# Patient Record
Sex: MALE | Race: BLACK OR AFRICAN AMERICAN | NOT HISPANIC OR LATINO | Employment: FULL TIME | URBAN - METROPOLITAN AREA
[De-identification: names, ages, dates, MRNs, and addresses within clinical notes are randomized per-mention and may not be internally consistent; named-entity substitution may affect disease eponyms.]

---

## 2017-06-07 ENCOUNTER — TELEPHONE (OUTPATIENT)
Dept: OPHTHALMOLOGY | Facility: CLINIC | Age: 47
End: 2017-06-07

## 2018-12-11 ENCOUNTER — OFFICE VISIT (OUTPATIENT)
Dept: UROLOGY | Facility: CLINIC | Age: 48
End: 2018-12-11
Payer: COMMERCIAL

## 2018-12-11 VITALS
WEIGHT: 232 LBS | HEIGHT: 67 IN | HEART RATE: 73 BPM | DIASTOLIC BLOOD PRESSURE: 103 MMHG | SYSTOLIC BLOOD PRESSURE: 176 MMHG | BODY MASS INDEX: 36.41 KG/M2

## 2018-12-11 DIAGNOSIS — N45.1 EPIDIDYMITIS: Primary | ICD-10-CM

## 2018-12-11 DIAGNOSIS — N13.8 BPH WITH OBSTRUCTION/LOWER URINARY TRACT SYMPTOMS: ICD-10-CM

## 2018-12-11 DIAGNOSIS — N40.1 BPH WITH OBSTRUCTION/LOWER URINARY TRACT SYMPTOMS: ICD-10-CM

## 2018-12-11 DIAGNOSIS — R35.1 NOCTURIA: ICD-10-CM

## 2018-12-11 LAB
BILIRUB SERPL-MCNC: ABNORMAL MG/DL
BLOOD URINE, POC: 50
COLOR, POC UA: YELLOW
GLUCOSE UR QL STRIP: ABNORMAL
KETONES UR QL STRIP: ABNORMAL
LEUKOCYTE ESTERASE URINE, POC: ABNORMAL
NITRITE, POC UA: ABNORMAL
PH, POC UA: 5
PROTEIN, POC: 30
SPECIFIC GRAVITY, POC UA: 1.02
UROBILINOGEN, POC UA: ABNORMAL

## 2018-12-11 PROCEDURE — 99204 OFFICE O/P NEW MOD 45 MIN: CPT | Mod: 25,S$GLB,, | Performed by: NURSE PRACTITIONER

## 2018-12-11 PROCEDURE — 87077 CULTURE AEROBIC IDENTIFY: CPT

## 2018-12-11 PROCEDURE — 81002 URINALYSIS NONAUTO W/O SCOPE: CPT | Mod: S$GLB,,, | Performed by: NURSE PRACTITIONER

## 2018-12-11 PROCEDURE — 87186 SC STD MICRODIL/AGAR DIL: CPT

## 2018-12-11 PROCEDURE — 87088 URINE BACTERIA CULTURE: CPT

## 2018-12-11 PROCEDURE — 3008F BODY MASS INDEX DOCD: CPT | Mod: CPTII,S$GLB,, | Performed by: NURSE PRACTITIONER

## 2018-12-11 PROCEDURE — 87086 URINE CULTURE/COLONY COUNT: CPT

## 2018-12-11 RX ORDER — TAMSULOSIN HYDROCHLORIDE 0.4 MG/1
0.4 CAPSULE ORAL DAILY
Qty: 30 CAPSULE | Refills: 11 | Status: SHIPPED | OUTPATIENT
Start: 2018-12-11 | End: 2019-07-26

## 2018-12-11 RX ORDER — CIPROFLOXACIN 500 MG/1
500 TABLET ORAL 2 TIMES DAILY
Qty: 20 TABLET | Refills: 0 | Status: SHIPPED | OUTPATIENT
Start: 2018-12-11 | End: 2018-12-21

## 2018-12-11 NOTE — PATIENT INSTRUCTIONS
Epididymitis  Inflammation of the epididymis can cause pain and swelling in your scrotum. The epididymis is a small tube next to the testicle that stores sperm. Epididymitis is usually caused by an infection. In sexually active men, it is often caused by a sexually transmitted disease (STD) such as chlamydia or gonorrhea. In boys and in men over 40, it can be from bacteria from other parts of the urinary tract (not an STD infection).  Symptoms may begin with pain in the lower belly (abdomen) or low back. The pain then spreads down into the scrotum. Usually only one side is affected. The testicle and scrotum swell and become very painful and red. You may have fever and a burning when passing urine. Sometimes you may have a discharge from the penis.  Treatment is with antibiotics, and anti-inflammatory and pain medicines. The condition should get better over the first few days of treatment. But it will take several weeks for all the swelling and discomfort to go away. If your healthcare provider suspects that an STD is the cause, your sexual partners may need to be treated.  Home care  The following will help you care for yourself at home:  · Support the scrotum. When lying down, place a rolled towel under the scrotum. When walking, use an athletic supporter or 2 pairs of jockey-style underwear.  · To relieve pain, put ice packs on the inflamed area. You can make your own ice pack by putting ice cubes in a sealed plastic bag wrapped in a thin towel.  · You may use over-the-counter medicines to control pain, unless another medicine was given. If you have chronic liver or kidney disease, talk with your healthcare provider before taking these medicines. Also talk with your provider if you've ever had a stomach ulcer or GI bleeding.  · Rest in bed for the first few days until the fever, pain, and swelling get better. It may take several weeks for all of the swelling to go away.  · Constipation can make you strain. This  makes the pain worse. Avoid constipation by eating natural laxatives such as prunes, fresh fruits, and whole-grain cereals. If necessary, use a mild over-the-counter laxative for constipation. Mineral oil can be used to keep the stools soft.  · Do not have sex until you have finished all treatment and all symptoms have cleared.  · Take all medicine as directed. Do not miss any doses and do not stop early even if you feel better.  Follow-up care  Follow up with your healthcare provider, or as advised, to be sure you are responding properly to treatment. If a culture was taken, you may call for the result as directed. A culture test can ensure that you are on the correct antibiotic.   When to seek medical advice  Call your healthcare provider right away if any of these occur:  · Fever of 100.4ºF (38ºC), or as directed by your healthcare provider  · Increasing pain or swelling of the testicle after starting treatment  · Pressure or pain in your bladder that gets worse  · Unable to pass urine for 8 hours  Date Last Reviewed: 9/1/2016  © 1622-8161 [employer]. [address] [place] [address], [place] [address]. All rights reserved. This information is not intended as a substitute for professional medical care. Always follow your healthcare professional's instructions.

## 2018-12-11 NOTE — PROGRESS NOTES
"Subjective:      Carmelo Castle Jr. is a 48 y.o. male who was self-referred for evaluation of his "scrotal swelling."      The patient presents today reporting left testicular pain/swelling that began over the last few days. He describes the pain as aching and moderate to severe in intensity. Aggravated by sitting and movement. Denies penile discharge. Not concerned about STD exposure.     Reports ongoing frequency, urgency and nocturia 5x/night. Drinks large amounts of caffeine and alcohol. Denies dysuria, fever/chills and hematuria. His AUASS today is 14/5.      The following portions of the patient's history were reviewed and updated as appropriate: allergies, current medications, past family history, past medical history, past social history, past surgical history and problem list.    Review of Systems  Constitutional: no fever or chills  ENT: no nasal congestion or sore throat  Respiratory: no cough or shortness of breath  Cardiovascular: no chest pain or palpitations  Gastrointestinal: no nausea or vomiting, tolerating diet  Genitourinary: as per HPI  Hematologic/Lymphatic: no easy bruising or lymphadenopathy  Musculoskeletal: no arthralgias or myalgias  Neurological: no seizures or tremors  Behavioral/Psych: no auditory or visual hallucinations     Objective:   Vitals:   Vitals:    12/11/18 1302   BP: (!) 176/103   Pulse: 73       Physical Exam   General: alert and oriented, no acute distress  Head: normocephalic, atraumatic  Neck: supple, no lymphadenopathy, normal ROM, no masses  Respiratory: Symmetric expansion, non-labored breathing  Cardiovascular: regular rate and rhythm, nomal pulses, no peripheral edema  Abdomen: soft, non tender, non distended, no palpable masses, no hernias, no hepatomegaly or splenomegaly  Genitourinary: deferred   Lymphatic: no inguinal nodes  Skin: normal coloration and turgor, no rashes, no suspicious skin lesions noted  Neuro: alert and oriented x3, no gross deficits  Psych: " normal judgment and insight, normal mood/affect and non-anxious  No CVA tenderness     Lab Review   Urinalysis demonstrates positive for leukocytes (trace), red blood cells (50 niall/mL), protein (+)  Lab Results   Component Value Date    WBC 7.90 06/30/2011    HGB 14.9 06/30/2011    HCT 43.8 06/30/2011    MCV 83.4 06/30/2011     (L) 06/30/2011     Lab Results   Component Value Date    CREATININE 0.9 06/30/2011    BUN 11 06/30/2011     No results found for: PSA  Imaging   None    Assessment:   Epididymitis  BPH with obstruction/lower urinary tract symptoms  Nocturia    Plan:   Carmelo was seen today for new patient.    Diagnoses and all orders for this visit:    Epididymitis  -     POCT URINE DIPSTICK WITHOUT MICROSCOPE  -     ciprofloxacin HCl (CIPRO) 500 MG tablet; Take 1 tablet (500 mg total) by mouth 2 (two) times daily. for 10 days  -     Urine culture    BPH with obstruction/lower urinary tract symptoms  -     tamsulosin (FLOMAX) 0.4 mg Cap; Take 1 capsule (0.4 mg total) by mouth once daily.    Nocturia    Plan:  --Urine culture  --Cipro BID x 10 days  --Discussed conservative measures for relieving testicular pain - scrotal support, ibuprofen, scrotal elevation, ice packs  --Trial of flomax  --Discussed common bladder irritants such as caffeine, alcohol, citrus, and acidic and spicy foods. Encouraged to minimize in diet to reduce symptoms.  --Follow up in 6-8 weeks with PVR

## 2018-12-14 LAB — BACTERIA UR CULT: NORMAL

## 2019-07-26 ENCOUNTER — HOSPITAL ENCOUNTER (EMERGENCY)
Facility: HOSPITAL | Age: 49
Discharge: HOME | End: 2019-07-26
Attending: EMERGENCY MEDICINE
Payer: COMMERCIAL

## 2019-07-26 VITALS
TEMPERATURE: 98 F | HEIGHT: 66 IN | BODY MASS INDEX: 35.68 KG/M2 | WEIGHT: 222 LBS | HEART RATE: 76 BPM | DIASTOLIC BLOOD PRESSURE: 105 MMHG | RESPIRATION RATE: 18 BRPM | SYSTOLIC BLOOD PRESSURE: 173 MMHG | OXYGEN SATURATION: 97 %

## 2019-07-26 DIAGNOSIS — M25.512 ACUTE PAIN OF LEFT SHOULDER: Primary | ICD-10-CM
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
## 2019-07-26 PROCEDURE — 99283 EMERGENCY DEPT VISIT LOW MDM: CPT

## 2019-07-26 PROCEDURE — 25000003 PHARM REV CODE 250: Performed by: PHYSICIAN ASSISTANT

## 2019-07-26 RX ORDER — IBUPROFEN 600 MG/1
600 TABLET ORAL
Status: COMPLETED | OUTPATIENT
Start: 2019-07-26 | End: 2019-07-26

## 2019-07-26 RX ORDER — METHOCARBAMOL 750 MG/1
1500 TABLET, FILM COATED ORAL 3 TIMES DAILY
Qty: 20 TABLET | Refills: 0 | Status: SHIPPED | OUTPATIENT
Start: 2019-07-26 | End: 2019-07-31

## 2019-07-26 RX ORDER — IBUPROFEN 600 MG/1
600 TABLET ORAL EVERY 8 HOURS PRN
Qty: 15 TABLET | Refills: 0 | Status: SHIPPED | OUTPATIENT
Start: 2019-07-26

## 2019-07-26 RX ORDER — ACETAMINOPHEN 500 MG
500 TABLET ORAL
Status: COMPLETED | OUTPATIENT
Start: 2019-07-26 | End: 2019-07-26

## 2019-07-26 RX ADMIN — IBUPROFEN 600 MG: 600 TABLET, FILM COATED ORAL at 05:07

## 2019-07-26 RX ADMIN — ACETAMINOPHEN 500 MG: 500 TABLET ORAL at 05:07
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
## 2020-05-30 ENCOUNTER — HOSPITAL ENCOUNTER (EMERGENCY)
Facility: HOSPITAL | Age: 50
Discharge: HOME | End: 2020-05-30
Attending: EMERGENCY MEDICINE
Payer: COMMERCIAL

## 2020-05-30 VITALS
BODY MASS INDEX: 35.68 KG/M2 | OXYGEN SATURATION: 100 % | RESPIRATION RATE: 18 BRPM | DIASTOLIC BLOOD PRESSURE: 88 MMHG | SYSTOLIC BLOOD PRESSURE: 139 MMHG | HEART RATE: 96 BPM | WEIGHT: 222 LBS | TEMPERATURE: 98 F | HEIGHT: 66 IN

## 2020-05-30 DIAGNOSIS — R51.9 NONINTRACTABLE HEADACHE, UNSPECIFIED CHRONICITY PATTERN, UNSPECIFIED HEADACHE TYPE: Primary | ICD-10-CM

## 2020-05-30 PROCEDURE — 63600175 PHARM REV CODE 636 W HCPCS: Performed by: PHYSICIAN ASSISTANT

## 2020-05-30 PROCEDURE — 99284 EMERGENCY DEPT VISIT MOD MDM: CPT | Mod: 25

## 2020-05-30 PROCEDURE — 96372 THER/PROPH/DIAG INJ SC/IM: CPT

## 2020-05-30 RX ORDER — KETOROLAC TROMETHAMINE 30 MG/ML
30 INJECTION, SOLUTION INTRAMUSCULAR; INTRAVENOUS
Status: COMPLETED | OUTPATIENT
Start: 2020-05-30 | End: 2020-05-30

## 2020-05-30 RX ORDER — KETOROLAC TROMETHAMINE 10 MG/1
10 TABLET, FILM COATED ORAL EVERY 6 HOURS
Qty: 12 TABLET | Refills: 0 | Status: SHIPPED | OUTPATIENT
Start: 2020-05-30 | End: 2020-06-02

## 2020-05-30 RX ORDER — METHOCARBAMOL 750 MG/1
1500 TABLET, FILM COATED ORAL 3 TIMES DAILY
Qty: 30 TABLET | Refills: 0 | Status: SHIPPED | OUTPATIENT
Start: 2020-05-30 | End: 2020-06-04

## 2020-05-30 RX ORDER — ORPHENADRINE CITRATE 30 MG/ML
30 INJECTION INTRAMUSCULAR; INTRAVENOUS
Status: COMPLETED | OUTPATIENT
Start: 2020-05-30 | End: 2020-05-30

## 2020-05-30 RX ADMIN — ORPHENADRINE CITRATE 30 MG: 30 INJECTION INTRAMUSCULAR; INTRAVENOUS at 01:05

## 2020-05-30 RX ADMIN — KETOROLAC TROMETHAMINE 30 MG: 30 INJECTION, SOLUTION INTRAMUSCULAR at 01:05

## 2020-05-30 NOTE — ED NOTES
Pt presents to the ED c/o+ HA x3 days with blurry vision x2 mos. Reports recently starting BP meds 2 days ago prescribed by PCP for HTN .  Pt had been previously diagnosed but was non complaint.

## 2020-05-30 NOTE — ED PROVIDER NOTES
Encounter Date: 5/30/2020       History     Chief Complaint   Patient presents with    Headache     Pt presents to ED today c/o neck pain and right side of head pain that radiates to front of head x 3 days. Also c/o blurry vision x 2 months. He reports he just started taking BP medication x 2 days ago, he was diagnoses with HTN years ago and was non compliant. He saw his PCP and was prescribed medication again.      Carmelo Castle Jr., a 50 y.o. male  has no past medical history on file.     He presents to the ED evaluation of headache x 3 days.  Patient states that he headache waxes and wanes.  Believe it is due to stress, as he recently experienced the death of his brother 2 days ago.  States that he was diagnosed with HTN in 2013 but has been non-complaint with medications until his brother's sudden death made him reconsider.  Started taking his medications 2 days ago.  Has suffered with blurry vision for 2 months, has prescription eye glasses, but feels that his vision gets worse every time he goes to the eye doctor and that the glasses may be contributing. Headache is throbbing and stabbing in nature, starts at the base of his neck and wraps around his head.  Treatments tried include use of excedrin migraine with some improvement.        The history is provided by the patient.     Review of patient's allergies indicates:   Allergen Reactions    Erythromycin Other (See Comments)     hallucinations     No past medical history on file.  No past surgical history on file.  Family History   Problem Relation Age of Onset    Heart disease Mother     Hypertension Mother      Social History     Tobacco Use    Smoking status: Current Some Day Smoker    Smokeless tobacco: Never Used   Substance Use Topics    Alcohol use: Yes    Drug use: No     Review of Systems   Constitutional: Negative for fever.   Eyes: Positive for visual disturbance (x 2 months). Negative for photophobia.   Gastrointestinal: Negative for  nausea and vomiting.   Musculoskeletal: Positive for neck pain and neck stiffness.   Neurological: Positive for headaches.       Physical Exam     Initial Vitals [05/30/20 1222]   BP Pulse Resp Temp SpO2   (!) 163/96 96 19 98 °F (36.7 °C) 96 %      MAP       --         Physical Exam    Nursing note and vitals reviewed.  Constitutional: He appears well-developed and well-nourished.   HENT:   Head: Normocephalic and atraumatic.   Right Ear: External ear normal.   Left Ear: External ear normal.   Nose: Nose normal.   Mouth/Throat: Oropharynx is clear and moist.   Eyes: EOM are normal.   Neck: Normal range of motion and full passive range of motion without pain. Neck supple. No spinous process tenderness and no muscular tenderness present. Normal range of motion present. No neck rigidity.   Cardiovascular: Normal rate and regular rhythm.   Pulmonary/Chest: Breath sounds normal. No respiratory distress. He has no wheezes. He has no rales.   Abdominal: Soft. Bowel sounds are normal. He exhibits no distension.   Musculoskeletal: Normal range of motion. He exhibits no edema or tenderness.   Lymphadenopathy:     He has no cervical adenopathy.   Neurological: He is alert and oriented to person, place, and time. No cranial nerve deficit or sensory deficit. GCS score is 15. GCS eye subscore is 4. GCS verbal subscore is 5. GCS motor subscore is 6.   Skin: Skin is warm and dry. Capillary refill takes less than 2 seconds. No rash and no abscess noted. No erythema.   Psychiatric: He has a normal mood and affect. Thought content normal.         ED Course   Procedures  Labs Reviewed - No data to display       Imaging Results    None          Medical Decision Making:   Initial Assessment:   Headache  Differential Diagnosis:   Tension headache, migraine,  cluster headache, sinus headache    ED Management:  Feel the patient's headache is benign.  The headache improved with toradol and norflex.  No neck stiffness, vision changes, fever,  "rash, meningismus/neck stiffness to suggest pseudotumor cerebri or meningitis.  No pain over temporal arteries or vision changes/loss to suggest temporal arteritis.  No "thunderclap onset" or neck stiffness to suggest spontaneous SAH/ICH.  No lancinated pain to eyes with tearing to suggest cluster headache.  No sinus pressure or nasal congestion to suggest sinus headache.  Patient's headache is not in a "band like" distrubution to suggest tension headache.                                  Clinical Impression:       ICD-10-CM ICD-9-CM   1. Nonintractable headache, unspecified chronicity pattern, unspecified headache type R51 784.0             ED Disposition Condition    Discharge Stable        ED Prescriptions     Medication Sig Dispense Start Date End Date Auth. Provider    ketorolac (TORADOL) 10 mg tablet Take 1 tablet (10 mg total) by mouth every 6 (six) hours. for 3 days 12 tablet 5/30/2020 6/2/2020 Patti Pierce PA-C    methocarbamoL (ROBAXIN) 750 MG Tab Take 2 tablets (1,500 mg total) by mouth 3 (three) times daily. for 5 days 30 tablet 5/30/2020 6/4/2020 Patti Pierce PA-C        Follow-up Information     Follow up With Specialties Details Why Contact Info    Jim Silva MD Family Medicine In 3 days  [address] [facility] [address]  [phone]                                       Patti Pierce PA-C  05/30/20 1503    "

## 2020-05-30 NOTE — ED NOTES
APPEARANCE: Alert, oriented in NAD   CARDIAC: Normal rate and rhythm   PERIPHERAL VASCULAR: peripheral pulses present. Normal cap refill. No edema. Warm to touch.    RESPIRATORY: Respirations are equal and unlabored no obvious signs of distress.  GASTRO: soft, bowel sounds normal, no tenderness, no abdominal distention.  MUSC: Full ROM. No bony tenderness or soft tissue tenderness. No obvious deformity.  SKIN: Skin is warm and dry, normal skin turgor, mucous membranes moist.  NEURO: Reports + frontal HA x3 days   MENTAL STATUS: awake, alert oriented   EYE: PERRL reports blurry vision x2 months

## 2022-06-06 ENCOUNTER — HOSPITAL ENCOUNTER (OUTPATIENT)
Facility: HOSPITAL | Age: 52
Discharge: HOME | End: 2022-06-07
Attending: EMERGENCY MEDICINE | Admitting: HOSPITALIST
Payer: COMMERCIAL

## 2022-06-06 DIAGNOSIS — R07.9 CHEST PAIN: ICD-10-CM

## 2022-06-06 DIAGNOSIS — I16.1 HYPERTENSIVE EMERGENCY WITHOUT CONGESTIVE HEART FAILURE: ICD-10-CM

## 2022-06-06 DIAGNOSIS — R07.89 OTHER CHEST PAIN: ICD-10-CM

## 2022-06-06 DIAGNOSIS — I16.0 HYPERTENSIVE URGENCY: Primary | ICD-10-CM

## 2022-06-06 PROBLEM — I21.4 NSTEMI (NON-ST ELEVATED MYOCARDIAL INFARCTION): Status: ACTIVE | Noted: 2022-06-06

## 2022-06-06 LAB
ALBUMIN SERPL BCP-MCNC: 4 G/DL (ref 3.5–5.2)
ALP SERPL-CCNC: 98 U/L (ref 55–135)
ALT SERPL W/O P-5'-P-CCNC: 35 U/L (ref 10–44)
ANION GAP SERPL CALC-SCNC: 12 MMOL/L (ref 8–16)
AST SERPL-CCNC: 24 U/L (ref 10–40)
BASOPHILS # BLD AUTO: 0.04 K/UL (ref 0–0.2)
BASOPHILS NFR BLD: 0.4 % (ref 0–1.9)
BILIRUB SERPL-MCNC: 0.6 MG/DL (ref 0.1–1)
BILIRUB UR QL STRIP: NEGATIVE
BNP SERPL-MCNC: <10 PG/ML (ref 0–99)
BUN SERPL-MCNC: 14 MG/DL (ref 6–20)
CALCIUM SERPL-MCNC: 9.7 MG/DL (ref 8.7–10.5)
CHLORIDE SERPL-SCNC: 106 MMOL/L (ref 95–110)
CLARITY UR: CLEAR
CO2 SERPL-SCNC: 23 MMOL/L (ref 23–29)
COLOR UR: YELLOW
CREAT SERPL-MCNC: 1 MG/DL (ref 0.5–1.4)
DIFFERENTIAL METHOD: ABNORMAL
EOSINOPHIL # BLD AUTO: 0.3 K/UL (ref 0–0.5)
EOSINOPHIL NFR BLD: 3.1 % (ref 0–8)
ERYTHROCYTE [DISTWIDTH] IN BLOOD BY AUTOMATED COUNT: 13.9 % (ref 11.5–14.5)
EST. GFR  (AFRICAN AMERICAN): >60 ML/MIN/1.73 M^2
EST. GFR  (NON AFRICAN AMERICAN): >60 ML/MIN/1.73 M^2
GLUCOSE SERPL-MCNC: 99 MG/DL (ref 70–110)
GLUCOSE UR QL STRIP: NEGATIVE
HCT VFR BLD AUTO: 50.2 % (ref 40–54)
HGB BLD-MCNC: 16.3 G/DL (ref 14–18)
HGB UR QL STRIP: NEGATIVE
IMM GRANULOCYTES # BLD AUTO: 0.11 K/UL (ref 0–0.04)
IMM GRANULOCYTES NFR BLD AUTO: 1.1 % (ref 0–0.5)
KETONES UR QL STRIP: NEGATIVE
LEUKOCYTE ESTERASE UR QL STRIP: NEGATIVE
LYMPHOCYTES # BLD AUTO: 3 K/UL (ref 1–4.8)
LYMPHOCYTES NFR BLD: 30.9 % (ref 18–48)
MCH RBC QN AUTO: 27.7 PG (ref 27–31)
MCHC RBC AUTO-ENTMCNC: 32.5 G/DL (ref 32–36)
MCV RBC AUTO: 85 FL (ref 82–98)
MONOCYTES # BLD AUTO: 0.8 K/UL (ref 0.3–1)
MONOCYTES NFR BLD: 7.9 % (ref 4–15)
NEUTROPHILS # BLD AUTO: 5.5 K/UL (ref 1.8–7.7)
NEUTROPHILS NFR BLD: 56.6 % (ref 38–73)
NITRITE UR QL STRIP: NEGATIVE
NRBC BLD-RTO: 0 /100 WBC
PH UR STRIP: 6 [PH] (ref 5–8)
PLATELET # BLD AUTO: 144 K/UL (ref 150–450)
PMV BLD AUTO: 13.8 FL (ref 9.2–12.9)
POTASSIUM SERPL-SCNC: 4.5 MMOL/L (ref 3.5–5.1)
PROT SERPL-MCNC: 8.1 G/DL (ref 6–8.4)
PROT UR QL STRIP: NEGATIVE
RBC # BLD AUTO: 5.89 M/UL (ref 4.6–6.2)
SODIUM SERPL-SCNC: 141 MMOL/L (ref 136–145)
SP GR UR STRIP: 1.02 (ref 1–1.03)
TROPONIN I SERPL DL<=0.01 NG/ML-MCNC: 0.03 NG/ML (ref 0–0.03)
URN SPEC COLLECT METH UR: NORMAL
UROBILINOGEN UR STRIP-ACNC: NEGATIVE EU/DL
WBC # BLD AUTO: 9.69 K/UL (ref 3.9–12.7)

## 2022-06-06 PROCEDURE — A4216 STERILE WATER/SALINE, 10 ML: HCPCS | Performed by: NURSE PRACTITIONER

## 2022-06-06 PROCEDURE — 81003 URINALYSIS AUTO W/O SCOPE: CPT | Performed by: NURSE PRACTITIONER

## 2022-06-06 PROCEDURE — 96372 THER/PROPH/DIAG INJ SC/IM: CPT | Mod: 59 | Performed by: NURSE PRACTITIONER

## 2022-06-06 PROCEDURE — 36415 COLL VENOUS BLD VENIPUNCTURE: CPT | Performed by: NURSE PRACTITIONER

## 2022-06-06 PROCEDURE — G0378 HOSPITAL OBSERVATION PER HR: HCPCS

## 2022-06-06 PROCEDURE — 84484 ASSAY OF TROPONIN QUANT: CPT | Performed by: EMERGENCY MEDICINE

## 2022-06-06 PROCEDURE — 25000003 PHARM REV CODE 250: Performed by: EMERGENCY MEDICINE

## 2022-06-06 PROCEDURE — 93010 EKG 12-LEAD: ICD-10-PCS | Mod: ,,, | Performed by: INTERNAL MEDICINE

## 2022-06-06 PROCEDURE — 99285 EMERGENCY DEPT VISIT HI MDM: CPT | Mod: 25

## 2022-06-06 PROCEDURE — 63600175 PHARM REV CODE 636 W HCPCS: Performed by: NURSE PRACTITIONER

## 2022-06-06 PROCEDURE — 93010 ELECTROCARDIOGRAM REPORT: CPT | Mod: ,,, | Performed by: INTERNAL MEDICINE

## 2022-06-06 PROCEDURE — 80053 COMPREHEN METABOLIC PANEL: CPT | Performed by: EMERGENCY MEDICINE

## 2022-06-06 PROCEDURE — 83880 ASSAY OF NATRIURETIC PEPTIDE: CPT | Performed by: EMERGENCY MEDICINE

## 2022-06-06 PROCEDURE — 25000003 PHARM REV CODE 250: Performed by: NURSE PRACTITIONER

## 2022-06-06 PROCEDURE — 85025 COMPLETE CBC W/AUTO DIFF WBC: CPT | Performed by: EMERGENCY MEDICINE

## 2022-06-06 PROCEDURE — 93005 ELECTROCARDIOGRAM TRACING: CPT

## 2022-06-06 PROCEDURE — 84484 ASSAY OF TROPONIN QUANT: CPT | Mod: 91 | Performed by: NURSE PRACTITIONER

## 2022-06-06 PROCEDURE — 96374 THER/PROPH/DIAG INJ IV PUSH: CPT

## 2022-06-06 PROCEDURE — 96376 TX/PRO/DX INJ SAME DRUG ADON: CPT

## 2022-06-06 PROCEDURE — 63600175 PHARM REV CODE 636 W HCPCS: Performed by: EMERGENCY MEDICINE

## 2022-06-06 RX ORDER — ACETAMINOPHEN 325 MG/1
650 TABLET ORAL EVERY 8 HOURS PRN
Status: DISCONTINUED | OUTPATIENT
Start: 2022-06-06 | End: 2022-06-07 | Stop reason: HOSPADM

## 2022-06-06 RX ORDER — HYDRALAZINE HYDROCHLORIDE 20 MG/ML
10 INJECTION INTRAMUSCULAR; INTRAVENOUS EVERY 6 HOURS PRN
Status: DISCONTINUED | OUTPATIENT
Start: 2022-06-06 | End: 2022-06-07 | Stop reason: HOSPADM

## 2022-06-06 RX ORDER — ENOXAPARIN SODIUM 100 MG/ML
40 INJECTION SUBCUTANEOUS EVERY 24 HOURS
Status: DISCONTINUED | OUTPATIENT
Start: 2022-06-06 | End: 2022-06-07 | Stop reason: HOSPADM

## 2022-06-06 RX ORDER — HYDROCHLOROTHIAZIDE 25 MG/1
25 TABLET ORAL DAILY
Status: ON HOLD | COMMUNITY
End: 2022-06-07 | Stop reason: SDUPTHER

## 2022-06-06 RX ORDER — NALOXONE HCL 0.4 MG/ML
0.02 VIAL (ML) INJECTION
Status: DISCONTINUED | OUTPATIENT
Start: 2022-06-06 | End: 2022-06-07 | Stop reason: HOSPADM

## 2022-06-06 RX ORDER — IBUPROFEN 200 MG
16 TABLET ORAL
Status: DISCONTINUED | OUTPATIENT
Start: 2022-06-06 | End: 2022-06-07 | Stop reason: HOSPADM

## 2022-06-06 RX ORDER — TALC
6 POWDER (GRAM) TOPICAL NIGHTLY PRN
Status: DISCONTINUED | OUTPATIENT
Start: 2022-06-06 | End: 2022-06-07 | Stop reason: HOSPADM

## 2022-06-06 RX ORDER — HYDRALAZINE HYDROCHLORIDE 20 MG/ML
5 INJECTION INTRAMUSCULAR; INTRAVENOUS
Status: COMPLETED | OUTPATIENT
Start: 2022-06-06 | End: 2022-06-06

## 2022-06-06 RX ORDER — ONDANSETRON 2 MG/ML
4 INJECTION INTRAMUSCULAR; INTRAVENOUS EVERY 8 HOURS PRN
Status: DISCONTINUED | OUTPATIENT
Start: 2022-06-06 | End: 2022-06-07 | Stop reason: HOSPADM

## 2022-06-06 RX ORDER — CLONIDINE HYDROCHLORIDE 0.1 MG/1
0.1 TABLET ORAL
Status: COMPLETED | OUTPATIENT
Start: 2022-06-06 | End: 2022-06-06

## 2022-06-06 RX ORDER — AMLODIPINE BESYLATE 10 MG/1
10 TABLET ORAL DAILY
Status: ON HOLD | COMMUNITY
End: 2022-06-07 | Stop reason: SDUPTHER

## 2022-06-06 RX ORDER — GLUCAGON 1 MG
1 KIT INJECTION
Status: DISCONTINUED | OUTPATIENT
Start: 2022-06-06 | End: 2022-06-07 | Stop reason: HOSPADM

## 2022-06-06 RX ORDER — SODIUM CHLORIDE 0.9 % (FLUSH) 0.9 %
10 SYRINGE (ML) INJECTION EVERY 8 HOURS
Status: DISCONTINUED | OUTPATIENT
Start: 2022-06-06 | End: 2022-06-07 | Stop reason: HOSPADM

## 2022-06-06 RX ORDER — ASPIRIN 81 MG/1
81 TABLET ORAL DAILY
Status: DISCONTINUED | OUTPATIENT
Start: 2022-06-07 | End: 2022-06-07 | Stop reason: HOSPADM

## 2022-06-06 RX ORDER — NAPROXEN SODIUM 220 MG/1
324 TABLET, FILM COATED ORAL
Status: COMPLETED | OUTPATIENT
Start: 2022-06-06 | End: 2022-06-06

## 2022-06-06 RX ORDER — IPRATROPIUM BROMIDE AND ALBUTEROL SULFATE 2.5; .5 MG/3ML; MG/3ML
3 SOLUTION RESPIRATORY (INHALATION) EVERY 4 HOURS PRN
Status: DISCONTINUED | OUTPATIENT
Start: 2022-06-06 | End: 2022-06-07 | Stop reason: HOSPADM

## 2022-06-06 RX ORDER — IBUPROFEN 200 MG
24 TABLET ORAL
Status: DISCONTINUED | OUTPATIENT
Start: 2022-06-06 | End: 2022-06-07 | Stop reason: HOSPADM

## 2022-06-06 RX ADMIN — HYDRALAZINE HYDROCHLORIDE 5 MG: 20 INJECTION, SOLUTION INTRAMUSCULAR; INTRAVENOUS at 09:06

## 2022-06-06 RX ADMIN — CLONIDINE HYDROCHLORIDE 0.1 MG: 0.1 TABLET ORAL at 06:06

## 2022-06-06 RX ADMIN — ENOXAPARIN SODIUM 40 MG: 100 INJECTION SUBCUTANEOUS at 11:06

## 2022-06-06 RX ADMIN — Medication 10 ML: at 11:06

## 2022-06-06 RX ADMIN — ASPIRIN 324 MG: 81 TABLET, CHEWABLE ORAL at 09:06

## 2022-06-06 NOTE — FIRST PROVIDER EVALUATION
" Emergency Department TeleTriage Encounter Note      CHIEF COMPLAINT    Chief Complaint   Patient presents with    Hypertension     Pt has hx of HTN, non compliment with b/p meds, pt states since last wed, left hand " feels like increased pressure in hand"  and on " wed or thurs saw black dots after getting up, which lasted from a few min, then resolved"        VITAL SIGNS   Initial Vitals [06/06/22 1611]   BP Pulse Resp Temp SpO2   (!) 179/92 87 18 98.3 °F (36.8 °C) 99 %      MAP       --            ALLERGIES    Review of patient's allergies indicates:   Allergen Reactions    Erythromycin Other (See Comments)     hallucinations       PROVIDER TRIAGE NOTE  Pt c hx of HTN non-compliant c meds.  Went to  last week and noted SBP@230.  Pt states he was told to come to ED but started old amilodipine tab instead.  Came to ED for eval today because intermittent CP and L arm soreness (He was concerned @ "stroke).  Labs/clonidine ordered.  Pt will be seen ASAP by the on site clinician for further evaluation.      ORDERS  Labs Reviewed - No data to display    ED Orders (720h ago, onward)    None            Virtual Visit Note: The provider triage portion of this emergency department evaluation and documentation was performed via [employer], a HIPAA-compliant telemedicine application, in concert with a tele-presenter in the room. A face to face patient evaluation with one of my colleagues will occur once the patient is placed in an emergency department room.      DISCLAIMER: This note was prepared with [employer] voice recognition transcription software. Garbled syntax, mangled pronouns, and other bizarre constructions may be attributed to that software system.  "

## 2022-06-06 NOTE — Clinical Note
Diagnosis: Chest pain [303286]   Future Attending Provider: FAM MEEHAN [[id number]]   Admitting Provider:: FAM MEEHAN [[id number]]   Special Needs:: No Special Needs [1]

## 2022-06-07 ENCOUNTER — CLINICAL SUPPORT (OUTPATIENT)
Dept: SMOKING CESSATION | Facility: CLINIC | Age: 52
End: 2022-06-07
Payer: COMMERCIAL

## 2022-06-07 VITALS
SYSTOLIC BLOOD PRESSURE: 145 MMHG | OXYGEN SATURATION: 97 % | HEIGHT: 66 IN | BODY MASS INDEX: 40.66 KG/M2 | DIASTOLIC BLOOD PRESSURE: 85 MMHG | HEART RATE: 84 BPM | WEIGHT: 253 LBS | TEMPERATURE: 97 F | RESPIRATION RATE: 18 BRPM

## 2022-06-07 DIAGNOSIS — F17.210 CIGARETTE SMOKER: Primary | ICD-10-CM

## 2022-06-07 PROBLEM — E66.01 MORBID OBESITY: Status: ACTIVE | Noted: 2022-06-07

## 2022-06-07 PROBLEM — F17.200 TOBACCO DEPENDENCE: Status: ACTIVE | Noted: 2022-06-07

## 2022-06-07 PROBLEM — R07.9 CHEST PAIN: Status: ACTIVE | Noted: 2022-06-06

## 2022-06-07 PROBLEM — I20.9 ACUTE ANGINA: Status: ACTIVE | Noted: 2022-06-06

## 2022-06-07 LAB
AORTIC ROOT ANNULUS: 3.22 CM
AORTIC VALVE CUSP SEPERATION: 1.9 CM
AV INDEX (PROSTH): 0.86
AV MEAN GRADIENT: 3 MMHG
AV PEAK GRADIENT: 5 MMHG
AV VALVE AREA: 2.8 CM2
AV VELOCITY RATIO: 0.85
BSA FOR ECHO PROCEDURE: 2.31 M2
CHOLEST SERPL-MCNC: 176 MG/DL (ref 120–199)
CHOLEST/HDLC SERPL: 2.3 {RATIO} (ref 2–5)
CV ECHO LV RWT: 0.47 CM
DOP CALC AO PEAK VEL: 1.17 M/S
DOP CALC AO VTI: 21.17 CM
DOP CALC LVOT AREA: 3.3 CM2
DOP CALC LVOT DIAMETER: 2.04 CM
DOP CALC LVOT PEAK VEL: 0.99 M/S
DOP CALC LVOT STROKE VOLUME: 59.33 CM3
DOP CALC MV VTI: 26.47 CM
DOP CALCLVOT PEAK VEL VTI: 18.16 CM
E WAVE DECELERATION TIME: 237.45 MSEC
E/A RATIO: 0.74
E/E' RATIO: 9.85 M/S
ECHO LV POSTERIOR WALL: 1.16 CM (ref 0.6–1.1)
EJECTION FRACTION: 55 %
FRACTIONAL SHORTENING: 27 % (ref 28–44)
HDLC SERPL-MCNC: 76 MG/DL (ref 40–75)
HDLC SERPL: 43.2 % (ref 20–50)
INTERVENTRICULAR SEPTUM: 1.22 CM (ref 0.6–1.1)
IVRT: 74.22 MSEC
LA MAJOR: 5.12 CM
LA MINOR: 4.94 CM
LA WIDTH: 3.42 CM
LDLC SERPL CALC-MCNC: 72 MG/DL (ref 63–159)
LEFT ATRIUM SIZE: 3.31 CM
LEFT ATRIUM VOLUME INDEX MOD: 14.7 ML/M2
LEFT ATRIUM VOLUME INDEX: 21.9 ML/M2
LEFT ATRIUM VOLUME MOD: 32.5 CM3
LEFT ATRIUM VOLUME: 48.38 CM3
LEFT INTERNAL DIMENSION IN SYSTOLE: 3.63 CM (ref 2.1–4)
LEFT VENTRICLE DIASTOLIC VOLUME INDEX: 52.29 ML/M2
LEFT VENTRICLE DIASTOLIC VOLUME: 115.55 ML
LEFT VENTRICLE MASS INDEX: 103 G/M2
LEFT VENTRICLE SYSTOLIC VOLUME INDEX: 25.2 ML/M2
LEFT VENTRICLE SYSTOLIC VOLUME: 55.59 ML
LEFT VENTRICULAR INTERNAL DIMENSION IN DIASTOLE: 4.95 CM (ref 3.5–6)
LEFT VENTRICULAR MASS: 227.36 G
LV LATERAL E/E' RATIO: 8 M/S
LV SEPTAL E/E' RATIO: 12.8 M/S
MV A" WAVE DURATION": 9.71 MSEC
MV MEAN GRADIENT: 0 MMHG
MV PEAK A VEL: 0.87 M/S
MV PEAK E VEL: 0.64 M/S
MV PEAK GRADIENT: 3 MMHG
MV STENOSIS PRESSURE HALF TIME: 68.86 MS
MV VALVE AREA BY CONTINUITY EQUATION: 2.24 CM2
MV VALVE AREA P 1/2 METHOD: 3.19 CM2
NONHDLC SERPL-MCNC: 100 MG/DL
PISA TR MAX VEL: 1.16 M/S
PULM VEIN S/D RATIO: 1.29
PV PEAK D VEL: 0.35 M/S
PV PEAK S VEL: 0.45 M/S
PV PEAK VELOCITY: 1.06 CM/S
RA MAJOR: 4.51 CM
RA PRESSURE: 3 MMHG
RA WIDTH: 3.16 CM
RIGHT VENTRICULAR END-DIASTOLIC DIMENSION: 2.61 CM
TDI LATERAL: 0.08 M/S
TDI SEPTAL: 0.05 M/S
TDI: 0.07 M/S
TR MAX PG: 5 MMHG
TRICUSPID ANNULAR PLANE SYSTOLIC EXCURSION: 2.17 CM
TRIGL SERPL-MCNC: 140 MG/DL (ref 30–150)
TROPONIN I SERPL DL<=0.01 NG/ML-MCNC: 0.03 NG/ML (ref 0–0.03)
TROPONIN I SERPL DL<=0.01 NG/ML-MCNC: 0.04 NG/ML (ref 0–0.03)
TV REST PULMONARY ARTERY PRESSURE: 8 MMHG

## 2022-06-07 PROCEDURE — 99900035 HC TECH TIME PER 15 MIN (STAT)

## 2022-06-07 PROCEDURE — 36415 COLL VENOUS BLD VENIPUNCTURE: CPT | Performed by: NURSE PRACTITIONER

## 2022-06-07 PROCEDURE — 99407 PR TOBACCO USE CESSATION INTENSIVE >10 MINUTES: ICD-10-PCS | Mod: S$GLB,,,

## 2022-06-07 PROCEDURE — G0378 HOSPITAL OBSERVATION PER HR: HCPCS

## 2022-06-07 PROCEDURE — 84484 ASSAY OF TROPONIN QUANT: CPT | Performed by: NURSE PRACTITIONER

## 2022-06-07 PROCEDURE — 80061 LIPID PANEL: CPT | Performed by: NURSE PRACTITIONER

## 2022-06-07 PROCEDURE — 94760 N-INVAS EAR/PLS OXIMETRY 1: CPT

## 2022-06-07 PROCEDURE — A4216 STERILE WATER/SALINE, 10 ML: HCPCS | Performed by: NURSE PRACTITIONER

## 2022-06-07 PROCEDURE — 25000003 PHARM REV CODE 250: Performed by: NURSE PRACTITIONER

## 2022-06-07 PROCEDURE — 99219 PR INITIAL OBSERVATION CARE,LEVL II: CPT | Mod: 25,,, | Performed by: INTERNAL MEDICINE

## 2022-06-07 PROCEDURE — 99219 PR INITIAL OBSERVATION CARE,LEVL II: ICD-10-PCS | Mod: 25,,, | Performed by: INTERNAL MEDICINE

## 2022-06-07 PROCEDURE — 99999 PR PBB SHADOW E&M-EST. PATIENT-LVL I: CPT | Mod: PBBFAC,,,

## 2022-06-07 PROCEDURE — 99999 PR PBB SHADOW E&M-EST. PATIENT-LVL I: ICD-10-PCS | Mod: PBBFAC,,,

## 2022-06-07 PROCEDURE — 99407 BEHAV CHNG SMOKING > 10 MIN: CPT | Mod: S$GLB,,,

## 2022-06-07 RX ORDER — HYDROCHLOROTHIAZIDE 25 MG/1
25 TABLET ORAL DAILY
Status: DISCONTINUED | OUTPATIENT
Start: 2022-06-07 | End: 2022-06-07 | Stop reason: HOSPADM

## 2022-06-07 RX ORDER — HYDROCHLOROTHIAZIDE 25 MG/1
25 TABLET ORAL DAILY
Qty: 30 TABLET | Refills: 0 | Status: SHIPPED | OUTPATIENT
Start: 2022-06-07 | End: 2022-06-07 | Stop reason: SDUPTHER

## 2022-06-07 RX ORDER — AMLODIPINE BESYLATE 10 MG/1
10 TABLET ORAL DAILY
Qty: 30 TABLET | Refills: 0 | Status: SHIPPED | OUTPATIENT
Start: 2022-06-07

## 2022-06-07 RX ORDER — ASPIRIN 81 MG/1
81 TABLET ORAL DAILY
Qty: 30 TABLET | Refills: 0 | Status: SHIPPED | OUTPATIENT
Start: 2022-06-08 | End: 2023-06-08

## 2022-06-07 RX ORDER — HYDROCHLOROTHIAZIDE 50 MG/1
50 TABLET ORAL DAILY
Qty: 30 TABLET | Refills: 0 | Status: SHIPPED | OUTPATIENT
Start: 2022-06-07

## 2022-06-07 RX ORDER — AMLODIPINE BESYLATE 5 MG/1
10 TABLET ORAL DAILY
Status: DISCONTINUED | OUTPATIENT
Start: 2022-06-07 | End: 2022-06-07 | Stop reason: HOSPADM

## 2022-06-07 RX ADMIN — AMLODIPINE BESYLATE 10 MG: 5 TABLET ORAL at 08:06

## 2022-06-07 RX ADMIN — HYDROCHLOROTHIAZIDE 25 MG: 25 TABLET ORAL at 08:06

## 2022-06-07 RX ADMIN — Medication 10 ML: at 05:06

## 2022-06-07 RX ADMIN — Medication 6 MG: at 01:06

## 2022-06-07 RX ADMIN — ASPIRIN 81 MG: 81 TABLET, COATED ORAL at 08:06

## 2022-06-07 NOTE — ASSESSMENT & PLAN NOTE
"-Hypertensive on admission with elevation in troponin  -Reports not taking amlodipine and HCTZ prescribed in 2018 in years "I was gone beat this without it"  -Reports he was changing his diet to resolve hypertension  -For past two weeks been experiencing SOB and presented to ED for CP, SOB, and dizziness  -Given clonidine and hydralazine in the ED with noted improvement  -PRN hydralazine for SBP > 170 DBP > 110  -Restart home meds  -BP improved after resuming home HTN meds. Patient advised to continue home meds and educated regarding risks associated with non-compliance.  -Will give rx for HTN meds. Follow up with PCP outpatient.    "

## 2022-06-07 NOTE — SUBJECTIVE & OBJECTIVE
Past Medical History:   Diagnosis Date    Hypertension        History reviewed. No pertinent surgical history.    Review of patient's allergies indicates:   Allergen Reactions    Erythromycin Other (See Comments)     hallucinations       No current facility-administered medications on file prior to encounter.     Current Outpatient Medications on File Prior to Encounter   Medication Sig    amLODIPine (NORVASC) 10 MG tablet Take 10 mg by mouth once daily.    hydroCHLOROthiazide (HYDRODIURIL) 25 MG tablet Take 25 mg by mouth once daily.    ibuprofen (ADVIL,MOTRIN) 600 MG tablet Take 1 tablet (600 mg total) by mouth every 8 (eight) hours as needed for Pain.     Family History       Problem Relation (Age of Onset)    Heart disease Mother    Hypertension Mother          Tobacco Use    Smoking status: Current Every Day Smoker     Years: 38.00     Start date: 1984    Smokeless tobacco: Never Used    Tobacco comment: Enrolled in the [employer] on 6/7/22.  Ambulatory referral to Smoking Cessation jacqueline.   Substance and Sexual Activity    Alcohol use: Yes    Drug use: No    Sexual activity: Yes     Review of Systems   Constitutional: Negative. Negative for diaphoresis.   HENT: Negative.     Eyes:  Positive for visual disturbance.   Cardiovascular:  Positive for chest pain. Negative for irregular heartbeat, leg swelling, near-syncope, orthopnea, palpitations, paroxysmal nocturnal dyspnea and syncope.   Respiratory:  Negative for cough and shortness of breath.    Endocrine: Negative.    Hematologic/Lymphatic: Negative.    Skin: Negative.    Musculoskeletal: Negative.    Gastrointestinal: Negative.  Negative for nausea and vomiting.   Genitourinary: Negative.    Neurological:  Positive for light-headedness and paresthesias.   Psychiatric/Behavioral: Negative.     Allergic/Immunologic: Negative.    Objective:     Vital Signs (Most Recent):  Temp: 96.4 °F (35.8 °C) (06/07/22 1053)  Pulse: 71 (06/07/22 1053)  Resp: 18  (06/07/22 1053)  BP: (!) 173/93 (06/07/22 1053)  SpO2: 97 % (06/07/22 1053)   Vital Signs (24h Range):  Temp:  [96.4 °F (35.8 °C)-98.3 °F (36.8 °C)] 96.4 °F (35.8 °C)  Pulse:  [64-99] 71  Resp:  [16-20] 18  SpO2:  [96 %-99 %] 97 %  BP: (141-190)/() 173/93     Weight: 114.8 kg (253 lb)  Body mass index is 40.84 kg/m².    SpO2: 97 %  O2 Device (Oxygen Therapy): room air    No intake or output data in the 24 hours ending 06/07/22 1151    Lines/Drains/Airways       Peripheral Intravenous Line  Duration                  Peripheral IV - Single Lumen 06/06/22 1803 20 G;1 in Right Antecubital <1 day                    Physical Exam  Constitutional:       General: He is not in acute distress.     Appearance: He is obese. He is not diaphoretic.   HENT:      Head: Atraumatic.   Eyes:      General:         Right eye: No discharge.         Left eye: No discharge.   Cardiovascular:      Rate and Rhythm: Normal rate and regular rhythm.   Pulmonary:      Effort: Pulmonary effort is normal.      Breath sounds: Normal breath sounds.   Abdominal:      General: Bowel sounds are normal.      Palpations: Abdomen is soft.   Musculoskeletal:      Right lower leg: No edema.      Left lower leg: No edema.   Neurological:      Mental Status: He is alert and oriented to person, place, and time.   Psychiatric:         Mood and Affect: Mood normal.         Behavior: Behavior normal.         Thought Content: Thought content normal.         Judgment: Judgment normal.       Significant Labs: BMP:   Recent Labs   Lab 06/06/22  1801   GLU 99      K 4.5      CO2 23   BUN 14   CREATININE 1.0   CALCIUM 9.7   , CMP   Recent Labs   Lab 06/06/22  1801      K 4.5      CO2 23   GLU 99   BUN 14   CREATININE 1.0   CALCIUM 9.7   PROT 8.1   ALBUMIN 4.0   BILITOT 0.6   ALKPHOS 98   AST 24   ALT 35   ANIONGAP 12   ESTGFRAFRICA >60   EGFRNONAA >60   , CBC   Recent Labs   Lab 06/06/22  1801   WBC 9.69   HGB 16.3   HCT 50.2   *  "  , INR No results for input(s): INR, PROTIME in the last 48 hours., Lipid Panel   Recent Labs   Lab 06/07/22  0334   CHOL 176   HDL 76*   LDLCALC 72.0   TRIG 140   CHOLHDL 43.2   , Troponin   Recent Labs   Lab 06/06/22  1801 06/06/22  2355 06/07/22  0334   TROPONINI 0.032* 0.035* 0.027*   , and All pertinent lab results from the last 24 hours have been reviewed.    Significant Imaging: Echocardiogram: Transthoracic echo (TTE) complete (Cupid Only):   Results for orders placed or performed during the hospital encounter of 06/06/22   Echo   Result Value Ref Range    AV mean gradient 3 mmHg    Ao peak james 1.17 m/s    Ao VTI 21.17 cm    IVRT 74.22 msec    IVS 1.22 (A) 0.6 - 1.1 cm    LA size 3.31 cm    Left Atrium Major Axis 5.12 cm    Left Atrium Minor Axis 4.94 cm    LVIDd 4.95 3.5 - 6.0 cm    LVIDs 3.63 2.1 - 4.0 cm    LVOT diameter 2.04 cm    LVOT peak VTI 18.16 cm    Posterior Wall 1.16 (A) 0.6 - 1.1 cm    MV Peak A James 0.87 m/s    E wave deceleration time 237.45 msec    MV Peak E James 0.64 m/s    PV Peak D James 0.35 m/s    PV Peak S James 0.45 m/s    RA Major Axis 4.51 cm    RA Width 3.16 cm    RVDD 2.61 cm    TAPSE 2.17 cm    TR Max James 1.16 m/s    TDI LATERAL 0.08 m/s    TDI SEPTAL 0.05 m/s    LA WIDTH 3.42 cm    Ao root annulus 3.22 cm    AORTIC VALVE CUSP SEPERATION 1.90 cm    PV PEAK VELOCITY 1.06 cm/s    MV stenosis pressure 1/2 time 68.86 ms    LV Diastolic Volume 115.55 mL    LV Systolic Volume 55.59 mL    LVOT peak james 0.99 m/s    LA volume (mod) 32.50 cm3    MV "A" wave duration 9.71 msec    MV mean gradient 0 mmHg    MV peak gradient 3 mmHg    MV VTI 26.47 cm    LV LATERAL E/E' RATIO 8.00 m/s    LV SEPTAL E/E' RATIO 12.80 m/s    FS 27 %    LA volume 48.38 cm3    LV mass 227.36 g    Left Ventricle Relative Wall Thickness 0.47 cm    AV valve area 2.80 cm2    AV Velocity Ratio 0.85     AV index (prosthetic) 0.86     MV valve area p 1/2 method 3.19 cm2    MV valve area by continuity eq 2.24 cm2    E/A ratio " 0.74     Mean e' 0.07 m/s    Pulm vein S/D ratio 1.29     LVOT area 3.3 cm2    LVOT stroke volume 59.33 cm3    AV peak gradient 5 mmHg    E/E' ratio 9.85 m/s    LV Systolic Volume Index 25.2 mL/m2    LV Diastolic Volume Index 52.29 mL/m2    LA Volume Index 21.9 mL/m2    LV Mass Index 103 g/m2    Triscuspid Valve Regurgitation Peak Gradient 5 mmHg    LA Volume Index (Mod) 14.7 mL/m2    BSA 2.31 m2    Right Atrial Pressure (from IVC) 3 mmHg    EF 55 %    TV rest pulmonary artery pressure 8 mmHg    Narrative    · The left ventricle is normal in size with concentric remodeling and   normal systolic function.  · The estimated ejection fraction is 55%.  · Normal left ventricular diastolic function.  · The estimated PA systolic pressure is 8 mmHg.  · Normal right ventricular size with normal right ventricular systolic   function.  · Normal central venous pressure (3 mmHg).

## 2022-06-07 NOTE — PROGRESS NOTES
Individual Follow-Up Form    6/7/2022    Quit Date: To be determined    Clinical Status of Patient: Inpatient    Length of Service: 30 minutes    Comments: Smoking cessation education note: Pt is a 0.33 pk/day cigarette smoker x 38 yrs on average. He states that often times, he smokes an entire pack or more when drinking alcohol.  Pt denies nicotine withdrawal symptoms at this time. He states that he is ready to quit smoking. Enrolled in the [employer] during this encounter. Ambulatory referral to Smoking Cessation Program following hospital discharge.     Diagnosis: F17.210    Next Visit:  6/20/2022-11:30 am with Daylin OZUNA)

## 2022-06-07 NOTE — HPI
"Carmelo Castle is a 52-year-old male with HTN, medication noncompliance. Patient presented to the ED with CP, dizziness, arm pain/numbness. He reports that he has these symptoms when is BP is elevated and they are usually transient lasting a few minutes. Symptoms persisted for several days and his BP was noted to be consistently elevated. Patient reports that he purchased a BP cuff last week and he noted his SBP >230. Patient was evaluated by  last week who directed him to the ED. Patient went home and took Norvasc instead of proceeding as instructed. Patient reports that he thought he could "beat it" without seeking care. Patient is presently chest pain free at this time and has none of his presenting symptoms. BP is improved with medications. TTE pending.    "

## 2022-06-07 NOTE — PROGRESS NOTES
[facility]                    Pharmacy       Discharge Medication Education    Patient ACCEPTED medication education. Pharmacy has provided education on the name, indication, and possible side effects of the medication(s) prescribed, using teach-back method.     The following medications have also been discussed, during this admission.        Medication List        START taking these medications      aspirin 81 MG EC tablet  Commonly known as: ECOTRIN  Take 1 tablet (81 mg total) by mouth once daily.  Start taking on: June 8, 2022            CONTINUE taking these medications      amLODIPine 10 MG tablet  Commonly known as: NORVASC  Take 1 tablet (10 mg total) by mouth once daily.     hydroCHLOROthiazide 25 MG tablet  Commonly known as: HYDRODIURIL  Take 1 tablet (25 mg total) by mouth once daily.     ibuprofen 600 MG tablet  Commonly known as: ADVIL,MOTRIN  Take 1 tablet (600 mg total) by mouth every 8 (eight) hours as needed for Pain.               Where to Get Your Medications        These medications were sent to [facility]  [address], [place] [address]      Hours: Mon-Fri, 8a-5:30p Phone: [phone]   amLODIPine 10 MG tablet  aspirin 81 MG EC tablet  hydroCHLOROthiazide 25 MG tablet          Thank you  Ramin Tucker, PharmD  [phone]

## 2022-06-07 NOTE — PLAN OF CARE
SW met with pt at bedside to complete assessment. Pt is AxO 4 and able to verbally answer assessment questions. Upon entering pt room, pt was on the phone with his mother. Pt able to confirm demographic information and PCP. Pt reports d/c plan is for wife Teri to transport back home. Pt confirmed employment with the [employer] and receives insurance via employer. Pt is able to afford medications but has reported not taking medications prescribed back in 2018. Pt reports now willing to take medication and start a lifestyle change. Pt reports wanting to pack lunch for work, start a gym membership, and take medications as prescribed. Pt reports living at home with his wife, being independent with ADL's, and no DME in use currently. Pt reports some stressors with Covid and employment but overall needs are met. Pt open to attending hospital follow up appointments. SW updated whiteboard with [facility] name and contact information. SW confirmed pt understanding of Observation unit and expected discharge plan. SW will continue to follow pt throughout care and assist with any discharge needs.         06/07/22 1043   Discharge Planning   Assessment Type Discharge Planning Brief Assessment   Resource/Environmental Concerns none   Support Systems Spouse/significant other   Equipment Currently Used at Home none   Current Living Arrangements home/apartment/condo   Patient/Family Anticipates Transition to home   Patient/Family Anticipated Services at Transition none   DME Needed Upon Discharge  none   Discharge Plan A Home     No future appointments.     [facility]  [phone]

## 2022-06-07 NOTE — HPI
"Carmelo Castle is a 52-year-old male with a history of HTN with noncompliance with medications. He presented to the ED today complaining of chest pain for two days now associated with palpitations, "seeing black spots after getting up," SOB, and dizziness. He states he went to the urgent care last week and had a SBP of 230. He states he was told to come to the ED but he started his old amlodipine medication instead. Presented to the ED today because he was having intermittent chest pain with left arm soreness that he thought was a stroke. Patient reports he was was prescribed amlodipine and hydralazine by Dr. Jim Sandoval which he states he started taking about a week ago again since being prescribed in 2018. He denies chills, fever, nausea, vomiting, diarrhea.    In the ED: troponin 0.032, CXR with no acute abnormality. Labs otherwise unremarkable. BNP. Hypertensive emergency noted and given hydralazine 10 mg and clonidine 0.1 mg with noted improvement. Patient will be admitted for observation to [facility] for further cardiac workup.  "

## 2022-06-07 NOTE — ED PROVIDER NOTES
"Encounter Date: 6/6/2022       History     Chief Complaint   Patient presents with    Hypertension     Pt has hx of HTN, non compliment with b/p meds, pt states since last wed, left hand " feels like increased pressure in hand"  and on " wed or thurs saw black dots after getting up, which lasted from a few min, then resolved"      52-year-old male with a history of hypertension with noncompliance to medications presents to the emergency department with 2 days of a chest pain, palpitations, and dizziness.  He is supposed to be on amlodipine and hydralazine which he states that he started taking approximately 1 week ago again.  He was prescribed this medication in 2018. He denies any nausea/vomiting/diarrhea.  He denies any fever/chills.          Review of patient's allergies indicates:   Allergen Reactions    Erythromycin Other (See Comments)     hallucinations     Past Medical History:   Diagnosis Date    Hypertension      History reviewed. No pertinent surgical history.  Family History   Problem Relation Age of Onset    Heart disease Mother     Hypertension Mother      Social History     Tobacco Use    Smoking status: Current Every Day Smoker     Years: 38.00     Start date: 1984    Smokeless tobacco: Never Used    Tobacco comment: Enrolled in the [employer] on 6/7/22.  Ambulatory referral to Smoking Cessation jacqueline.   Substance Use Topics    Alcohol use: Yes    Drug use: No     Review of Systems   Respiratory: Positive for shortness of breath.    Cardiovascular: Positive for chest pain.   All other systems reviewed and are negative.      Physical Exam     Initial Vitals [06/06/22 1611]   BP Pulse Resp Temp SpO2   (!) 179/92 87 18 98.3 °F (36.8 °C) 99 %      MAP       --         Physical Exam    Nursing note and vitals reviewed.  Constitutional: He appears well-developed and well-nourished.   HENT:   Head: Normocephalic and atraumatic.   Eyes: EOM are normal. Pupils are equal, round, and reactive " to light.   Cardiovascular: Normal rate and regular rhythm.   No murmur heard.  Pulmonary/Chest: Breath sounds normal. He has no wheezes.   Musculoskeletal:         General: Normal range of motion.     Neurological: He is alert and oriented to person, place, and time.   Skin: Skin is warm and dry. Capillary refill takes less than 2 seconds.   Psychiatric: He has a normal mood and affect.         ED Course   Procedures  Labs Reviewed   CBC W/ AUTO DIFFERENTIAL - Abnormal; Notable for the following components:       Result Value    Platelets 144 (*)     MPV 13.8 (*)     Immature Granulocytes 1.1 (*)     Immature Grans (Abs) 0.11 (*)     All other components within normal limits   TROPONIN I - Abnormal; Notable for the following components:    Troponin I 0.032 (*)     All other components within normal limits   COMPREHENSIVE METABOLIC PANEL   B-TYPE NATRIURETIC PEPTIDE   URINALYSIS, REFLEX TO URINE CULTURE    Narrative:     If not done  Specimen Source->Urine     EKG Readings: (Independently Interpreted)   Sinus rhythm rate of 79, left axis deviation, nonspecific ST T-wave abnormalities interpreted by me     ECG Results          EKG 12-lead (In process)  Result time 06/07/22 08:10:18    In process by Interface, Lab In [facility] (06/07/22 08:10:18)                 Narrative:    Test Reason : R07.9,    Vent. Rate : 079 BPM     Atrial Rate : 079 BPM     P-R Int : 156 ms          QRS Dur : 074 ms      QT Int : 388 ms       P-R-T Axes : 041 011 -81 degrees     QTc Int : 444 ms    Normal sinus rhythm  Cannot rule out Inferior infarct ,age undetermined  Anterior infarct ,age undetermined  ST and T wave abnormality, consider lateral ischemia  Abnormal ECG  When compared with ECG of 30-JUN-2011 10:16,  Significant changes have occurred    Referred By: [email or web address]   SELF           Confirmed By:                             Imaging Results          X-Ray Chest PA And Lateral (Final result)  Result time 06/06/22 17:59:24    Final  result by Parvez Curtis MD (06/06/22 17:59:24)                 Impression:      No acute abnormality.      Electronically signed by: Parvez Curtis  Date:    06/06/2022  Time:    17:59             Narrative:    EXAMINATION:  XR CHEST PA AND LATERAL    CLINICAL HISTORY:  Chest Pain;    TECHNIQUE:  PA and lateral views of the chest were performed.    COMPARISON:  06/30/2011    FINDINGS:  The lungs are clear, with normal appearance of pulmonary vasculature and no pleural effusion or pneumothorax.    The cardiac silhouette is normal in size. The hilar and mediastinal contours are unremarkable.    Bones are intact.                                 Medications   sodium chloride 0.9% flush 10 mL (10 mLs Intravenous Given 6/7/22 0515)   albuterol-ipratropium 2.5 mg-0.5 mg/3 mL nebulizer solution 3 mL (has no administration in time range)   melatonin tablet 6 mg (6 mg Oral Given 6/7/22 0111)   acetaminophen tablet 650 mg (has no administration in time range)   naloxone 0.4 mg/mL injection 0.02 mg (has no administration in time range)   glucose chewable tablet 16 g (has no administration in time range)   glucose chewable tablet 24 g (has no administration in time range)   glucagon (human recombinant) injection 1 mg (has no administration in time range)   dextrose 10% bolus 125 mL (has no administration in time range)   dextrose 10% bolus 250 mL (has no administration in time range)   enoxaparin injection 40 mg (40 mg Subcutaneous Given 6/6/22 3477)   ondansetron injection 4 mg (has no administration in time range)   aspirin EC tablet 81 mg (81 mg Oral Given 6/7/22 0826)   hydrALAZINE injection 10 mg (has no administration in time range)   sars-cov-2 (covid-19) (Pfizer COVID-19) 30 mcg/0.3 ml injection 0.3 mL (has no administration in time range)   amLODIPine tablet 10 mg (10 mg Oral Given 6/7/22 0825)   hydroCHLOROthiazide tablet 25 mg (25 mg Oral Given 6/7/22 0825)   cloNIDine tablet 0.1 mg (0.1 mg Oral Given 6/6/22 2383)    hydrALAZINE injection 5 mg (5 mg Intravenous Given 6/6/22 2112)   aspirin chewable tablet 324 mg (324 mg Oral Given 6/6/22 2146)   hydrALAZINE injection 5 mg (5 mg Intravenous Given 6/6/22 2146)     Medical Decision Making:   ED Management:  Patient showing evidence of hypertensive heart disease with elevated troponin with normal troponin after looking at past records, continues to be hypertensive in the emergency department even though he states that he took his amlodipine and hydralazine today.  Patient given another dose hydralazine while in the emergency department which did help lower his blood pressure.  Will admit the patient for further workup to evaluate for cardiac and to establish a good antihypertensive regimen along with follow-up.  Consult with Hospital Medicine group who will admit the patient for further workup.                      Clinical Impression:   Final diagnoses:  [R07.9] Chest pain          ED Disposition Condition    Observation               Huber Weeks DO  06/07/22 2921

## 2022-06-07 NOTE — DISCHARGE SUMMARY
"[facility]  Discharge Summary      Patient Name: Carmelo Castle Jr.  MRN: [id number]  Patient Class: OP- Observation  Admission Date: 6/6/2022  Hospital Length of Stay: 0 days  Discharge Date and Time: No discharge date for patient encounter.  Attending Physician: William Spencer MD   Discharging Provider: William Spencer MD  Primary Care Provider: Jim Sandoval MD      HPI:   Carmelo Castle is a 52-year-old male with a history of HTN with noncompliance with medications. He presented to the ED today complaining of chest pain for two days now associated with palpitations, "seeing black spots after getting up," SOB, and dizziness. He states he went to the urgent care last week and had a SBP of 230. He states he was told to come to the ED but he started his old amlodipine medication instead. Presented to the ED today because he was having intermittent chest pain with left arm soreness that he thought was a stroke. Patient reports he was was prescribed amlodipine and hydralazine by Dr. Jim Sandoval which he states he started taking about a week ago again since being prescribed in 2018. He denies chills, fever, nausea, vomiting, diarrhea.    In the ED: troponin 0.032, CXR with no acute abnormality. Labs otherwise unremarkable. BNP. Hypertensive emergency noted and given hydralazine 10 mg and clonidine 0.1 mg with noted improvement. Patient will be admitted for observation to [facility] for further cardiac workup.      * No surgery found *      Hospital Course:   See individual problem list.       Goals of Care Treatment Preferences:  Code Status: Full Code      Consults:   Consults (From admission, onward)        Status Ordering Provider     Cardiology-[facility]  Once        Provider:  (Not yet assigned)    Completed EDWARD BORGES          * Hypertensive emergency without congestive heart failure  -Hypertensive on admission with elevation in troponin  -Reports not taking " "amlodipine and HCTZ prescribed in 2018 in years "I was gone beat this without it"  -Reports he was changing his diet to resolve hypertension  -For past two weeks been experiencing SOB and presented to ED for CP, SOB, and dizziness  -Given clonidine and hydralazine in the ED with noted improvement  -PRN hydralazine for SBP > 170 DBP > 110  -Restart home meds  -BP improved after resuming home HTN meds. Patient advised to continue home meds and educated regarding risks associated with non-compliance.  -Will give rx for HTN meds. Follow up with PCP outpatient.      Morbid obesity        Tobacco dependence  Assistance with smoking cessation was offered, including:  [x]  Medications  [x]  Counseling  []  Printed Information on Smoking Cessation  []  Referral to a Smoking Cessation Program    Patient was counseled regarding smoking for 3-10 minutes.        Chest pain  -C/o CP and SOB  -EKG: Normal sinus rhythm. ST and T wave abnormality, consider lateral ischemia  -CXR: No acute abnormality.  -Troponin 0.032-0.035  -BNP < 10  -Likely in setting of uncontrolled hypertension and non compliance with medications  -Trend troponin  -Consulted cardiology  -Trops have trended down. TTE with normal LV/RV function. Chest pain and elevated troponin likely 2/2 htn urgency  -Cleared for discharge home. Follow up with cardiology outpatient. Outpatient stress test.      Final Active Diagnoses:    Diagnosis Date Noted POA    PRINCIPAL PROBLEM:  Hypertensive emergency without congestive heart failure [I16.1] 06/06/2022 Yes    Tobacco dependence [F17.200] 06/07/2022 Yes    Morbid obesity [E66.01] 06/07/2022 Yes    Chest pain [R07.9] 06/06/2022 Yes      Problems Resolved During this Admission:       Discharged Condition: good    Disposition: Home or Self Care    Follow Up:    Patient Instructions:      Ambulatory referral/consult to Cardiology   Standing Status: Future   Referral Priority: Routine Referral Type: Consultation   Referral " Reason: Specialty Services Required   Requested Specialty: Cardiology   Number of Visits Requested: 1     Diet Cardiac     Notify your health care provider if you experience any of the following:  severe uncontrolled pain     Notify your health care provider if you experience any of the following:  difficulty breathing or increased cough     Notify your health care provider if you experience any of the following:  severe persistent headache     Notify your health care provider if you experience any of the following:  persistent dizziness, light-headedness, or visual disturbances     Notify your health care provider if you experience any of the following:  increased confusion or weakness     Exercise Stress - EKG   Standing Status: Future Standing Exp. Date: 06/07/23     Order Specific Question Answer Comments   Release to patient Immediate      Activity as tolerated       Significant Diagnostic Studies: Labs: All labs within the past 24 hours have been reviewed    Pending Diagnostic Studies:     None         Medications:  Reconciled Home Medications:      Medication List      START taking these medications    aspirin 81 MG EC tablet  Commonly known as: ECOTRIN  Take 1 tablet (81 mg total) by mouth once daily.  Start taking on: June 8, 2022        CHANGE how you take these medications    hydroCHLOROthiazide 50 MG tablet  Commonly known as: HYDRODIURIL  Take 1 tablet (50 mg total) by mouth once daily.  What changed:   · medication strength  · how much to take        CONTINUE taking these medications    amLODIPine 10 MG tablet  Commonly known as: NORVASC  Take 1 tablet (10 mg total) by mouth once daily.     ibuprofen 600 MG tablet  Commonly known as: ADVIL,MOTRIN  Take 1 tablet (600 mg total) by mouth every 8 (eight) hours as needed for Pain.            Indwelling Lines/Drains at time of discharge:   Lines/Drains/Airways     None                 Time spent on the discharge of patient: 35 minutes         William Spencer  MD  Department of [facility]  [place] [facility]

## 2022-06-07 NOTE — ASSESSMENT & PLAN NOTE
Noncompliant with antihypertensives as outpatient   Continue Norvasc and HCTZ.   BP improved at this time and will adjust PRN  TTE pending   Will need cardiology follow up as outpatient

## 2022-06-07 NOTE — PLAN OF CARE
Pt Carmelo to be transported home via spouse Teri back to pt home. Pt has a PCC hospital follow up scheduled. Pharmacist will go over home medications and reasons for medications. VN and bedside nurse to reiterate final discharge instructions.         06/07/22 1318   Final Note   Assessment Type Discharge Planning Brief Assessment   Anticipated Discharge Disposition Home   What phone number can be called within the next 1-3 days to see how you are doing after discharge? [phone]   Hospital Resources/Appts/Education Provided Appointments scheduled by Navigator/Coordinator;Appointments scheduled and added to AVS   Post-Acute Status   Discharge Delays (!) Personal Transportation     Future Appointments   Date Time Provider Department Center   6/20/2022  2:00 PM Monserrat Moran MD [facility]  [phone]

## 2022-06-07 NOTE — ASSESSMENT & PLAN NOTE
-C/o CP and SOB  -EKG: Normal sinus rhythm. ST and T wave abnormality, consider lateral ischemia  -CXR: No acute abnormality.  -Troponin 0.032-0.035  -BNP < 10  -Likely in setting of uncontrolled hypertension and non compliance with medications  -Trend troponin  -ECHO  -Consult cardiology

## 2022-06-07 NOTE — ASSESSMENT & PLAN NOTE
Assistance with smoking cessation was offered, including:  [x]  Medications  [x]  Counseling  []  Printed Information on Smoking Cessation  []  Referral to a Smoking Cessation Program    Patient was counseled regarding smoking for 3-10 minutes.

## 2022-06-07 NOTE — NURSING
Pt discharged home. Discharge instructions will be reviewed by virtual nurse. IV d/c'd to RFA. Pressure dressing applied. Telemetry monitor Discontinued. Pt will be rolled out to private car via wheelchair.

## 2022-06-07 NOTE — SUBJECTIVE & OBJECTIVE
"Past Medical History:   Diagnosis Date    Hypertension        History reviewed. No pertinent surgical history.    Review of patient's allergies indicates:   Allergen Reactions    Erythromycin Other (See Comments)     hallucinations       No current facility-administered medications on file prior to encounter.     Current Outpatient Medications on File Prior to Encounter   Medication Sig    amLODIPine (NORVASC) 10 MG tablet Take 10 mg by mouth once daily.    hydroCHLOROthiazide (HYDRODIURIL) 25 MG tablet Take 25 mg by mouth once daily.    ibuprofen (ADVIL,MOTRIN) 600 MG tablet Take 1 tablet (600 mg total) by mouth every 8 (eight) hours as needed for Pain.     Family History       Problem Relation (Age of Onset)    Heart disease Mother    Hypertension Mother          Tobacco Use    Smoking status: Current Some Day Smoker    Smokeless tobacco: Never Used   Substance and Sexual Activity    Alcohol use: Yes    Drug use: No    Sexual activity: Yes     Review of Systems   Constitutional: Negative.    HENT: Negative.     Eyes:         "Seeing black dots"   Respiratory:  Positive for shortness of breath (resolved).    Cardiovascular:  Positive for chest pain.   Gastrointestinal: Negative.    Endocrine: Negative.    Genitourinary: Negative.    Musculoskeletal: Negative.    Skin: Negative.    Neurological:  Positive for dizziness.   Objective:     Vital Signs (Most Recent):  Temp: 98.3 °F (36.8 °C) (06/06/22 2336)  Pulse: 91 (06/06/22 2358)  Resp: 18 (06/06/22 2336)  BP: (!) 169/90 (06/06/22 2358)  SpO2: 98 % (06/06/22 2336)   Vital Signs (24h Range):  Temp:  [98.1 °F (36.7 °C)-98.3 °F (36.8 °C)] 98.3 °F (36.8 °C)  Pulse:  [75-99] 91  Resp:  [16-20] 18  SpO2:  [96 %-99 %] 98 %  BP: (155-190)/() 169/90     Weight: 115 kg (253 lb 8.5 oz)  Body mass index is 40.92 kg/m².    Physical Exam  Vitals and nursing note reviewed.   Constitutional:       General: He is not in acute distress.     Appearance: Normal appearance. He is " obese. He is not ill-appearing or diaphoretic.   HENT:      Head: Normocephalic and atraumatic.      Mouth/Throat:      Mouth: Mucous membranes are moist.   Eyes:      Extraocular Movements: Extraocular movements intact.      Pupils: Pupils are equal, round, and reactive to light.   Cardiovascular:      Rate and Rhythm: Normal rate and regular rhythm.      Pulses: Normal pulses.      Heart sounds: Normal heart sounds. No murmur heard.  Pulmonary:      Effort: Pulmonary effort is normal. No respiratory distress.      Breath sounds: Normal breath sounds.   Abdominal:      General: Bowel sounds are normal. There is no distension.      Palpations: Abdomen is soft.      Tenderness: There is no abdominal tenderness.   Musculoskeletal:         General: No swelling. Normal range of motion.      Cervical back: Normal range of motion.   Skin:     General: Skin is warm and dry.      Capillary Refill: Capillary refill takes 2 to 3 seconds.   Neurological:      General: No focal deficit present.      Mental Status: He is alert and oriented to person, place, and time. Mental status is at baseline.   Psychiatric:         Mood and Affect: Mood normal.         Behavior: Behavior normal.         CRANIAL NERVES     CN III, IV, VI   Pupils are equal, round, and reactive to light.     Significant Labs: All pertinent labs within the past 24 hours have been reviewed.    Significant Imaging: I have reviewed all pertinent imaging results/findings within the past 24 hours.

## 2022-06-07 NOTE — PROGRESS NOTES
06/07/22 0005   Admission   Initial VN Admission Questions Complete   Communication Issues? None   Shift   Virtual Nurse - Patient Verbalized Approval Of Camera Use;VN Rounding

## 2022-06-07 NOTE — CONSULTS
"[place] - Telemetry  Cardiology  Consult Note    Patient Name: Carmelo Castle Jr.  MRN: [id number]  Admission Date: 6/6/2022  Hospital Length of Stay: 0 days  Code Status: Full Code   Attending Provider: William Spencer MD   Consulting Provider: Julien Mckeon NP  Primary Care Physician: Jim Sandoval MD  Principal Problem:Hypertensive emergency without congestive heart failure    Patient information was obtained from patient, past medical records and ER records.     [facility]  Consult performed by: Julien Mckeon NP  Consult ordered by: Sharri Sanz NP        Subjective:     Chief Complaint:  HTN     HPI:   Carmelo Castle is a 52-year-old male with HTN, medication noncompliance. Patient presented to the ED with CP, dizziness, arm pain/numbness. He reports that he has these symptoms when is BP is elevated and they are usually transient lasting a few minutes. Symptoms persisted for several days and his BP was noted to be consistently elevated. Patient reports that he purchased a BP cuff last week and he noted his SBP >230. Patient was evaluated by  last week who directed him to the ED. Patient went home and took Norvasc instead of proceeding as instructed. Patient reports that he thought he could "beat it" without seeking care. Patient is presently chest pain free at this time and has none of his presenting symptoms. BP is improved with medications. TTE pending.        Past Medical History:   Diagnosis Date    Hypertension        History reviewed. No pertinent surgical history.    Review of patient's allergies indicates:   Allergen Reactions    Erythromycin Other (See Comments)     hallucinations       No current facility-administered medications on file prior to encounter.     Current Outpatient Medications on File Prior to Encounter   Medication Sig    amLODIPine (NORVASC) 10 MG tablet Take 10 mg by mouth once daily.    hydroCHLOROthiazide (HYDRODIURIL) 25 MG tablet Take 25 mg by " mouth once daily.    ibuprofen (ADVIL,MOTRIN) 600 MG tablet Take 1 tablet (600 mg total) by mouth every 8 (eight) hours as needed for Pain.     Family History       Problem Relation (Age of Onset)    Heart disease Mother    Hypertension Mother          Tobacco Use    Smoking status: Current Every Day Smoker     Years: 38.00     Start date: 1984    Smokeless tobacco: Never Used    Tobacco comment: Enrolled in the [employer] on 6/7/22.  Ambulatory referral to Smoking Cessation jacqueline.   Substance and Sexual Activity    Alcohol use: Yes    Drug use: No    Sexual activity: Yes     Review of Systems   Constitutional: Negative. Negative for diaphoresis.   HENT: Negative.     Eyes:  Positive for visual disturbance.   Cardiovascular:  Positive for chest pain. Negative for irregular heartbeat, leg swelling, near-syncope, orthopnea, palpitations, paroxysmal nocturnal dyspnea and syncope.   Respiratory:  Negative for cough and shortness of breath.    Endocrine: Negative.    Hematologic/Lymphatic: Negative.    Skin: Negative.    Musculoskeletal: Negative.    Gastrointestinal: Negative.  Negative for nausea and vomiting.   Genitourinary: Negative.    Neurological:  Positive for light-headedness and paresthesias.   Psychiatric/Behavioral: Negative.     Allergic/Immunologic: Negative.    Objective:     Vital Signs (Most Recent):  Temp: 96.4 °F (35.8 °C) (06/07/22 1053)  Pulse: 71 (06/07/22 1053)  Resp: 18 (06/07/22 1053)  BP: (!) 173/93 (06/07/22 1053)  SpO2: 97 % (06/07/22 1053)   Vital Signs (24h Range):  Temp:  [96.4 °F (35.8 °C)-98.3 °F (36.8 °C)] 96.4 °F (35.8 °C)  Pulse:  [64-99] 71  Resp:  [16-20] 18  SpO2:  [96 %-99 %] 97 %  BP: (141-190)/() 173/93     Weight: 114.8 kg (253 lb)  Body mass index is 40.84 kg/m².    SpO2: 97 %  O2 Device (Oxygen Therapy): room air    No intake or output data in the 24 hours ending 06/07/22 1151    Lines/Drains/Airways       Peripheral Intravenous Line  Duration                   Peripheral IV - Single Lumen 06/06/22 1803 20 G;1 in Right Antecubital <1 day                    Physical Exam  Constitutional:       General: He is not in acute distress.     Appearance: He is obese. He is not diaphoretic.   HENT:      Head: Atraumatic.   Eyes:      General:         Right eye: No discharge.         Left eye: No discharge.   Cardiovascular:      Rate and Rhythm: Normal rate and regular rhythm.   Pulmonary:      Effort: Pulmonary effort is normal.      Breath sounds: Normal breath sounds.   Abdominal:      General: Bowel sounds are normal.      Palpations: Abdomen is soft.   Musculoskeletal:      Right lower leg: No edema.      Left lower leg: No edema.   Neurological:      Mental Status: He is alert and oriented to person, place, and time.   Psychiatric:         Mood and Affect: Mood normal.         Behavior: Behavior normal.         Thought Content: Thought content normal.         Judgment: Judgment normal.       Significant Labs: BMP:   Recent Labs   Lab 06/06/22  1801   GLU 99      K 4.5      CO2 23   BUN 14   CREATININE 1.0   CALCIUM 9.7   , CMP   Recent Labs   Lab 06/06/22  1801      K 4.5      CO2 23   GLU 99   BUN 14   CREATININE 1.0   CALCIUM 9.7   PROT 8.1   ALBUMIN 4.0   BILITOT 0.6   ALKPHOS 98   AST 24   ALT 35   ANIONGAP 12   ESTGFRAFRICA >60   EGFRNONAA >60   , CBC   Recent Labs   Lab 06/06/22  1801   WBC 9.69   HGB 16.3   HCT 50.2   *   , INR No results for input(s): INR, PROTIME in the last 48 hours., Lipid Panel   Recent Labs   Lab 06/07/22  0334   CHOL 176   HDL 76*   LDLCALC 72.0   TRIG 140   CHOLHDL 43.2   , Troponin   Recent Labs   Lab 06/06/22  1801 06/06/22  2355 06/07/22  0334   TROPONINI 0.032* 0.035* 0.027*   , and All pertinent lab results from the last 24 hours have been reviewed.    Significant Imaging: Echocardiogram: Transthoracic echo (TTE) complete (Cupid Only):   Results for orders placed or performed during the hospital encounter  "of 06/06/22   Echo   Result Value Ref Range    AV mean gradient 3 mmHg    Ao peak james 1.17 m/s    Ao VTI 21.17 cm    IVRT 74.22 msec    IVS 1.22 (A) 0.6 - 1.1 cm    LA size 3.31 cm    Left Atrium Major Axis 5.12 cm    Left Atrium Minor Axis 4.94 cm    LVIDd 4.95 3.5 - 6.0 cm    LVIDs 3.63 2.1 - 4.0 cm    LVOT diameter 2.04 cm    LVOT peak VTI 18.16 cm    Posterior Wall 1.16 (A) 0.6 - 1.1 cm    MV Peak A James 0.87 m/s    E wave deceleration time 237.45 msec    MV Peak E James 0.64 m/s    PV Peak D James 0.35 m/s    PV Peak S James 0.45 m/s    RA Major Axis 4.51 cm    RA Width 3.16 cm    RVDD 2.61 cm    TAPSE 2.17 cm    TR Max James 1.16 m/s    TDI LATERAL 0.08 m/s    TDI SEPTAL 0.05 m/s    LA WIDTH 3.42 cm    Ao root annulus 3.22 cm    AORTIC VALVE CUSP SEPERATION 1.90 cm    PV PEAK VELOCITY 1.06 cm/s    MV stenosis pressure 1/2 time 68.86 ms    LV Diastolic Volume 115.55 mL    LV Systolic Volume 55.59 mL    LVOT peak james 0.99 m/s    LA volume (mod) 32.50 cm3    MV "A" wave duration 9.71 msec    MV mean gradient 0 mmHg    MV peak gradient 3 mmHg    MV VTI 26.47 cm    LV LATERAL E/E' RATIO 8.00 m/s    LV SEPTAL E/E' RATIO 12.80 m/s    FS 27 %    LA volume 48.38 cm3    LV mass 227.36 g    Left Ventricle Relative Wall Thickness 0.47 cm    AV valve area 2.80 cm2    AV Velocity Ratio 0.85     AV index (prosthetic) 0.86     MV valve area p 1/2 method 3.19 cm2    MV valve area by continuity eq 2.24 cm2    E/A ratio 0.74     Mean e' 0.07 m/s    Pulm vein S/D ratio 1.29     LVOT area 3.3 cm2    LVOT stroke volume 59.33 cm3    AV peak gradient 5 mmHg    E/E' ratio 9.85 m/s    LV Systolic Volume Index 25.2 mL/m2    LV Diastolic Volume Index 52.29 mL/m2    LA Volume Index 21.9 mL/m2    LV Mass Index 103 g/m2    Triscuspid Valve Regurgitation Peak Gradient 5 mmHg    LA Volume Index (Mod) 14.7 mL/m2    BSA 2.31 m2    Right Atrial Pressure (from IVC) 3 mmHg    EF 55 %    TV rest pulmonary artery pressure 8 mmHg    Narrative    · The left " ventricle is normal in size with concentric remodeling and   normal systolic function.  · The estimated ejection fraction is 55%.  · Normal left ventricular diastolic function.  · The estimated PA systolic pressure is 8 mmHg.  · Normal right ventricular size with normal right ventricular systolic   function.  · Normal central venous pressure (3 mmHg).        Assessment and Plan:     * Hypertensive emergency without congestive heart failure  Noncompliant with antihypertensives as outpatient   Continue Norvasc and HCTZ.   BP improved at this time and will adjust PRN  TTE pending   Will need cardiology follow up as outpatient     Morbid obesity  Weight loss encouraged     Tobacco dependence  Smoking cessation     Acute angina  Angina likely due to hypertensive emergency with SBP >230  TTE pending   Will proceed with stress test as outpatient if TTE is unremarkable   Troponin flat at 0.03  EKG without acute ischemic changes         VTE Risk Mitigation (From admission, onward)         Ordered     enoxaparin injection 40 mg  Daily         06/06/22 2147     IP VTE HIGH RISK PATIENT  Once         06/06/22 2147     Place sequential compression device  Until discontinued         06/06/22 2147                Thank you for your consult. I will follow-up with patient. Please contact us if you have any additional questions.    Julien Mckeon NP  Cardiology   [place] - Telemetry

## 2022-06-07 NOTE — PHARMACY MED REC
"[facility]           Pharmacy  Admission Medication History     The home medication history was taken by Ramin Tucker PharmD.      Medication history obtained from Medications listed below were obtained from: Patient/family    Based on information gathered for medication list, you may go to "Admission" then "Reconcile Home Medications" tabs to review and/or act upon those items.      The home medication list has been updated by the Pharmacy department.    Please read ALL comments highlighted in yellow.    Please address this information as you see fit.     Feel free to contact us if you have any questions or require assistance.        No current facility-administered medications on file prior to encounter.     Current Outpatient Medications on File Prior to Encounter   Medication Sig Dispense Refill    amLODIPine (NORVASC) 10 MG tablet Take 10 mg by mouth once daily.      hydroCHLOROthiazide (HYDRODIURIL) 25 MG tablet Take 25 mg by mouth once daily.      ibuprofen (ADVIL,MOTRIN) 600 MG tablet Take 1 tablet (600 mg total) by mouth every 8 (eight) hours as needed for Pain. 15 tablet 0       Please address this information as you see fit.  Feel free to contact us if you have any questions or require assistance.    Ramin Tucker, Hans  [phone]                  .          "

## 2022-06-07 NOTE — ASSESSMENT & PLAN NOTE
"-Hypertensive on admission with elevation in troponin  -Reports not taking amlodipine and HCTZ prescribed in 2018 in years "I was gone beat this without it"  -Reports he was changing his diet to resolve hypertension  -For past two weeks been experiencing SOB and presented to ED for CP, SOB, and dizziness  -Given clonidine and hydralazine in the ED with noted improvement  -PRN hydralazine for SBP > 170 DBP > 110  -Restart home meds  -ECHO for evaluation of SOB   -Consult cardiology    "

## 2022-06-07 NOTE — PLAN OF CARE
Introduced as VN and will be reviewing discharge instructions.  Educated patient on reason for admission, home medication list, and discharge instructions including when to return to ED and the following doctor appointments.  Education per flowsheet.  Opportunity given for questions and questions answered. Nurse  notified of   completion of discharge education. Patient is waiting to receive a Covid booster shot

## 2022-06-07 NOTE — TREATMENT PLAN
Attempted to meet with patient prior to d/c but patient left prior to PCC nurse visit.      Future Appointments   Date Time Provider Department Center   6/20/2022 11:30 AM Daylin Robins [facility] MARGARET Fuentes   6/20/2022  2:00 PM Monserrat Moran MD [facility] MCKENZIE Fuentes

## 2022-06-07 NOTE — ASSESSMENT & PLAN NOTE
-C/o CP and SOB  -EKG: Normal sinus rhythm. ST and T wave abnormality, consider lateral ischemia  -CXR: No acute abnormality.  -Troponin 0.032-0.035  -BNP < 10  -Likely in setting of uncontrolled hypertension and non compliance with medications  -Trend troponin  -Consulted cardiology  -Trops have trended down. TTE with normal LV/RV function. Chest pain and elevated troponin likely 2/2 htn urgency  -Cleared for discharge home. Follow up with cardiology outpatient. Outpatient stress test.

## 2022-06-07 NOTE — ASSESSMENT & PLAN NOTE
Angina likely due to hypertensive emergency with SBP >230  TTE pending   Will proceed with stress test as outpatient if TTE is unremarkable   Troponin flat at 0.03  EKG without acute ischemic changes

## 2022-06-07 NOTE — H&P
"[facility]  History & Physical    Patient Name: Carmelo Castle Jr.  MRN: [id number]  Patient Class: OP- Observation  Admission Date: 6/6/2022  Attending Physician: Gaston Rosa, *   Primary Care Provider: Jim Sandoval MD         Patient information was obtained from patient, past medical records and ER records.     Subjective:     Principal Problem:Hypertensive emergency without congestive heart failure    Chief Complaint:   Chief Complaint   Patient presents with    Hypertension     Pt has hx of HTN, non compliment with b/p meds, pt states since last wed, left hand " feels like increased pressure in hand"  and on " wed or thurs saw black dots after getting up, which lasted from a few min, then resolved"         HPI: Carmelo Castle is a 52-year-old male with a history of HTN with noncompliance with medications. He presented to the ED today complaining of chest pain for two days now associated with palpitations, "seeing black spots after getting up," SOB, and dizziness. He states he went to the urgent care last week and had a SBP of 230. He states he was told to come to the ED but he started his old amlodipine medication instead. Presented to the ED today because he was having intermittent chest pain with left arm soreness that he thought was a stroke. Patient reports he was was prescribed amlodipine and hydralazine by Dr. Jim Sandoval which he states he started taking about a week ago again since being prescribed in 2018. He denies chills, fever, nausea, vomiting, diarrhea.    In the ED: troponin 0.032, CXR with no acute abnormality. Labs otherwise unremarkable. BNP. Hypertensive emergency noted and given hydralazine 10 mg and clonidine 0.1 mg with noted improvement. Patient will be admitted for observation to [facility] for further cardiac workup.      Past Medical History:   Diagnosis Date    Hypertension        History reviewed. No pertinent surgical " "history.    Review of patient's allergies indicates:   Allergen Reactions    Erythromycin Other (See Comments)     hallucinations       No current facility-administered medications on file prior to encounter.     Current Outpatient Medications on File Prior to Encounter   Medication Sig    amLODIPine (NORVASC) 10 MG tablet Take 10 mg by mouth once daily.    hydroCHLOROthiazide (HYDRODIURIL) 25 MG tablet Take 25 mg by mouth once daily.    ibuprofen (ADVIL,MOTRIN) 600 MG tablet Take 1 tablet (600 mg total) by mouth every 8 (eight) hours as needed for Pain.     Family History       Problem Relation (Age of Onset)    Heart disease Mother    Hypertension Mother          Tobacco Use    Smoking status: Current Some Day Smoker    Smokeless tobacco: Never Used   Substance and Sexual Activity    Alcohol use: Yes    Drug use: No    Sexual activity: Yes     Review of Systems   Constitutional: Negative.    HENT: Negative.     Eyes:         "Seeing black dots"   Respiratory:  Positive for shortness of breath (resolved).    Cardiovascular:  Positive for chest pain.   Gastrointestinal: Negative.    Endocrine: Negative.    Genitourinary: Negative.    Musculoskeletal: Negative.    Skin: Negative.    Neurological:  Positive for dizziness.   Objective:     Vital Signs (Most Recent):  Temp: 98.3 °F (36.8 °C) (06/06/22 2336)  Pulse: 91 (06/06/22 2358)  Resp: 18 (06/06/22 2336)  BP: (!) 169/90 (06/06/22 2358)  SpO2: 98 % (06/06/22 2336)   Vital Signs (24h Range):  Temp:  [98.1 °F (36.7 °C)-98.3 °F (36.8 °C)] 98.3 °F (36.8 °C)  Pulse:  [75-99] 91  Resp:  [16-20] 18  SpO2:  [96 %-99 %] 98 %  BP: (155-190)/() 169/90     Weight: 115 kg (253 lb 8.5 oz)  Body mass index is 40.92 kg/m².    Physical Exam  Vitals and nursing note reviewed.   Constitutional:       General: He is not in acute distress.     Appearance: Normal appearance. He is obese. He is not ill-appearing or diaphoretic.   HENT:      Head: Normocephalic and " "atraumatic.      Mouth/Throat:      Mouth: Mucous membranes are moist.   Eyes:      Extraocular Movements: Extraocular movements intact.      Pupils: Pupils are equal, round, and reactive to light.   Cardiovascular:      Rate and Rhythm: Normal rate and regular rhythm.      Pulses: Normal pulses.      Heart sounds: Normal heart sounds. No murmur heard.  Pulmonary:      Effort: Pulmonary effort is normal. No respiratory distress.      Breath sounds: Normal breath sounds.   Abdominal:      General: Bowel sounds are normal. There is no distension.      Palpations: Abdomen is soft.      Tenderness: There is no abdominal tenderness.   Musculoskeletal:         General: No swelling. Normal range of motion.      Cervical back: Normal range of motion.   Skin:     General: Skin is warm and dry.      Capillary Refill: Capillary refill takes 2 to 3 seconds.   Neurological:      General: No focal deficit present.      Mental Status: He is alert and oriented to person, place, and time. Mental status is at baseline.   Psychiatric:         Mood and Affect: Mood normal.         Behavior: Behavior normal.         CRANIAL NERVES     CN III, IV, VI   Pupils are equal, round, and reactive to light.     Significant Labs: All pertinent labs within the past 24 hours have been reviewed.    Significant Imaging: I have reviewed all pertinent imaging results/findings within the past 24 hours.    Assessment/Plan:     * Hypertensive emergency without congestive heart failure  -Hypertensive on admission with elevation in troponin  -Reports not taking amlodipine and HCTZ prescribed in 2018 in years "I was gone beat this without it"  -Reports he was changing his diet to resolve hypertension  -For past two weeks been experiencing SOB and presented to ED for CP, SOB, and dizziness  -Given clonidine and hydralazine in the ED with noted improvement  -PRN hydralazine for SBP > 170 DBP > 110  -Restart home meds  -ECHO for evaluation of SOB   -Consult " cardiology      Tobacco dependence  Assistance with smoking cessation was offered, including:  [x]  Medications  [x]  Counseling  []  Printed Information on Smoking Cessation  []  Referral to a Smoking Cessation Program    Patient was counseled regarding smoking for 3-10 minutes.        Acute angina  -C/o CP and SOB  -EKG: Normal sinus rhythm. ST and T wave abnormality, consider lateral ischemia  -CXR: No acute abnormality.  -Troponin 0.032-0.035  -BNP < 10  -Likely in setting of uncontrolled hypertension and non compliance with medications  -Trend troponin  -ECHO  -Consult cardiology       VTE Risk Mitigation (From admission, onward)         Ordered     enoxaparin injection 40 mg  Daily         06/06/22 2147     IP VTE HIGH RISK PATIENT  Once         06/06/22 2147     Place sequential compression device  Until discontinued         06/06/22 2147                 Sharri Sanz DNP, AGABeth [facility]-BC  Hospitalist   Department of Hospital Medicine   [facility]   Office #: [phone]

## 2022-06-08 ENCOUNTER — TELEPHONE (OUTPATIENT)
Dept: PRIMARY CARE CLINIC | Facility: CLINIC | Age: 52
End: 2022-06-08
Payer: COMMERCIAL

## 2022-06-08 NOTE — TELEPHONE ENCOUNTER
[facility] RN spoke with [facility] via phone [facility] follow up upon discharge. Pt agreeable to hospital follow up .RN informed pt of scheduled appointment and that appointment is on d/c AVS. Patient informed to bring portable home O2 if used and all medication bottles to PCC follow up appointment. Pt states he will drive himself to appointment.    Patient Contact info: [phone]     Person providing transportation contact info: [phone]     Barriers to attending PCC visit: none identified    Future Appointments   Date Time Provider Department Center   6/20/2022 11:30 AM Daylin Robins [facility] S[facility]KE [facility]   6/20/2022  2:00 PM Monserrat Moran MD [facility] IMPRI [facility]

## 2022-06-10 ENCOUNTER — TELEPHONE (OUTPATIENT)
Dept: ADMINISTRATIVE | Facility: OTHER | Age: 52
End: 2022-06-10
Payer: COMMERCIAL

## 2022-06-15 ENCOUNTER — TELEPHONE (OUTPATIENT)
Dept: DIABETES | Facility: CLINIC | Age: 52
End: 2022-06-15
Payer: COMMERCIAL

## 2022-06-15 ENCOUNTER — LAB VISIT (OUTPATIENT)
Dept: LAB | Facility: HOSPITAL | Age: 52
End: 2022-06-15
Attending: INTERNAL MEDICINE
Payer: COMMERCIAL

## 2022-06-15 ENCOUNTER — OFFICE VISIT (OUTPATIENT)
Dept: PRIMARY CARE CLINIC | Facility: CLINIC | Age: 52
End: 2022-06-15
Payer: COMMERCIAL

## 2022-06-15 VITALS
DIASTOLIC BLOOD PRESSURE: 94 MMHG | SYSTOLIC BLOOD PRESSURE: 132 MMHG | HEART RATE: 80 BPM | WEIGHT: 227.75 LBS | OXYGEN SATURATION: 97 % | BODY MASS INDEX: 36.76 KG/M2

## 2022-06-15 DIAGNOSIS — E66.01 MORBID OBESITY: ICD-10-CM

## 2022-06-15 DIAGNOSIS — R07.89 OTHER CHEST PAIN: ICD-10-CM

## 2022-06-15 DIAGNOSIS — R73.9 HYPERGLYCEMIA: ICD-10-CM

## 2022-06-15 DIAGNOSIS — F17.200 TOBACCO DEPENDENCE: ICD-10-CM

## 2022-06-15 DIAGNOSIS — I16.1 HYPERTENSIVE EMERGENCY: Primary | ICD-10-CM

## 2022-06-15 DIAGNOSIS — E11.65 TYPE 2 DIABETES MELLITUS WITH HYPERGLYCEMIA, WITHOUT LONG-TERM CURRENT USE OF INSULIN: ICD-10-CM

## 2022-06-15 PROBLEM — R07.9 CHEST PAIN: Status: RESOLVED | Noted: 2022-06-06 | Resolved: 2022-06-15

## 2022-06-15 LAB
ESTIMATED AVG GLUCOSE: 166 MG/DL (ref 68–131)
HBA1C MFR BLD: 7.4 % (ref 4–5.6)
TSH SERPL DL<=0.005 MIU/L-ACNC: 1.8 UIU/ML (ref 0.4–4)

## 2022-06-15 PROCEDURE — 3075F PR MOST RECENT SYSTOLIC BLOOD PRESS GE 130-139MM HG: ICD-10-PCS | Mod: CPTII,S$GLB,, | Performed by: INTERNAL MEDICINE

## 2022-06-15 PROCEDURE — 83036 HEMOGLOBIN GLYCOSYLATED A1C: CPT | Performed by: INTERNAL MEDICINE

## 2022-06-15 PROCEDURE — 3051F HG A1C>EQUAL 7.0%<8.0%: CPT | Mod: CPTII,S$GLB,, | Performed by: INTERNAL MEDICINE

## 2022-06-15 PROCEDURE — 3008F BODY MASS INDEX DOCD: CPT | Mod: CPTII,S$GLB,, | Performed by: INTERNAL MEDICINE

## 2022-06-15 PROCEDURE — 99999 PR PBB SHADOW E&M-EST. PATIENT-LVL III: ICD-10-PCS | Mod: PBBFAC,,, | Performed by: INTERNAL MEDICINE

## 2022-06-15 PROCEDURE — 3080F PR MOST RECENT DIASTOLIC BLOOD PRESSURE >= 90 MM HG: ICD-10-PCS | Mod: CPTII,S$GLB,, | Performed by: INTERNAL MEDICINE

## 2022-06-15 PROCEDURE — 99205 PR OFFICE/OUTPT VISIT, NEW, LEVL V, 60-74 MIN: ICD-10-PCS | Mod: S$GLB,,, | Performed by: INTERNAL MEDICINE

## 2022-06-15 PROCEDURE — 1159F MED LIST DOCD IN RCRD: CPT | Mod: CPTII,S$GLB,, | Performed by: INTERNAL MEDICINE

## 2022-06-15 PROCEDURE — 36415 COLL VENOUS BLD VENIPUNCTURE: CPT | Performed by: INTERNAL MEDICINE

## 2022-06-15 PROCEDURE — 99999 PR PBB SHADOW E&M-EST. PATIENT-LVL III: CPT | Mod: PBBFAC,,, | Performed by: INTERNAL MEDICINE

## 2022-06-15 PROCEDURE — 3008F PR BODY MASS INDEX (BMI) DOCUMENTED: ICD-10-PCS | Mod: CPTII,S$GLB,, | Performed by: INTERNAL MEDICINE

## 2022-06-15 PROCEDURE — 1160F RVW MEDS BY RX/DR IN RCRD: CPT | Mod: CPTII,S$GLB,, | Performed by: INTERNAL MEDICINE

## 2022-06-15 PROCEDURE — 3080F DIAST BP >= 90 MM HG: CPT | Mod: CPTII,S$GLB,, | Performed by: INTERNAL MEDICINE

## 2022-06-15 PROCEDURE — 1160F PR REVIEW ALL MEDS BY PRESCRIBER/CLIN PHARMACIST DOCUMENTED: ICD-10-PCS | Mod: CPTII,S$GLB,, | Performed by: INTERNAL MEDICINE

## 2022-06-15 PROCEDURE — 1159F PR MEDICATION LIST DOCUMENTED IN MEDICAL RECORD: ICD-10-PCS | Mod: CPTII,S$GLB,, | Performed by: INTERNAL MEDICINE

## 2022-06-15 PROCEDURE — 3075F SYST BP GE 130 - 139MM HG: CPT | Mod: CPTII,S$GLB,, | Performed by: INTERNAL MEDICINE

## 2022-06-15 PROCEDURE — 3051F PR MOST RECENT HEMOGLOBIN A1C LEVEL 7.0 - < 8.0%: ICD-10-PCS | Mod: CPTII,S$GLB,, | Performed by: INTERNAL MEDICINE

## 2022-06-15 PROCEDURE — 84443 ASSAY THYROID STIM HORMONE: CPT | Performed by: INTERNAL MEDICINE

## 2022-06-15 PROCEDURE — 99205 OFFICE O/P NEW HI 60 MIN: CPT | Mod: S$GLB,,, | Performed by: INTERNAL MEDICINE

## 2022-06-15 RX ORDER — DEXTROSE 4 G
1 TABLET,CHEWABLE ORAL DAILY
Qty: 1 EACH | Refills: 0 | Status: SHIPPED | OUTPATIENT
Start: 2022-06-15 | End: 2023-06-15

## 2022-06-15 RX ORDER — LANCETS
1 EACH MISCELLANEOUS DAILY
Qty: 100 EACH | Refills: 3 | Status: SHIPPED | OUTPATIENT
Start: 2022-06-15

## 2022-06-15 NOTE — PROGRESS NOTES
[facility]   New Visit Progress Note   Recent Hospital Discharge     PRESENTING HISTORY     Chief Complaint/Reason for Admission:  Follow up Hospital Discharge   PCP: Jim Sandoval MD    History of Present Illness:  Mr. Carmelo Castle Jr. is a 52 y.o. male who was recently admitted to the hospital.    [facility]  Discharge Summary        Patient Name: Carmelo Castle Jr.  MRN: [id number]  Patient Class: OP- Observation  Admission Date: 6/6/2022  Hospital Length of Stay: 0 days  Discharge Date and Time: No discharge date for patient encounter.  Attending Physician: William Spencer MD   Discharging Provider: William Spencer MD  Primary Care Provider: Jim Sandoval MD    ___________________________________________________________________    Today:  [facility].  Recently hospitalized for evaluation of chest pain.  Condition complicated by hypertensive emergency.  Off all home medications, including anti hypertensive regimen, until about one week prior when he started medications which were initially prescribed in 2018.   Admitted to [facility].  EKG with ST and T wave abnormalities.  Troponin borderline, not consistent with ACS.   2 D echo with concentric remodeling.   Patient seen in consultation with Cardiology team.  Recommendation for outpatient cardiac stress.   Discharged to home with medical management including new Rx for antihypertensive regimen.     Patient unaccompanied today.  Ambulatory and independent with ADL's.  Reports compliance with medication.   Has returned to work.  Works as  at local [employer]- on his feet a lot- no dyspnea or chest pain at work.  Endorses high sodium diet- frequently eats from buffet line at [employer]- counseling and education provided today.          Review of Systems  General ROS: negative for chills, fever or weight loss  Psychological ROS: negative for hallucination,  depression or suicidal ideation  Ophthalmic ROS: negative for blurry vision, photophobia or eye pain  ENT ROS: negative for epistaxis, sore throat or rhinorrhea  Respiratory ROS: no cough, shortness of breath, or wheezing  Cardiovascular ROS: no chest pain or dyspnea on exertion  Gastrointestinal ROS: no abdominal pain, change in bowel habits, or black/ bloody stools  Genito-Urinary ROS: no dysuria, trouble voiding, or hematuria  Musculoskeletal ROS: negative for gait disturbance or muscular weakness  Neurological ROS: no syncope or seizures; no ataxia  Dermatological ROS: negative for pruritis, rash and jaundice      PAST HISTORY:     Past Medical History:   Diagnosis Date    Hypertension        No past surgical history on file.    Family History   Problem Relation Age of Onset    Heart disease Mother     Hypertension Mother          MEDICATIONS & ALLERGIES:     Current Outpatient Medications on File Prior to Visit   Medication Sig Dispense Refill    amLODIPine (NORVASC) 10 MG tablet Take 1 tablet (10 mg total) by mouth once daily. 30 tablet 0    aspirin (ECOTRIN) 81 MG EC tablet Take 1 tablet (81 mg total) by mouth once daily. 30 tablet 0    hydroCHLOROthiazide (HYDRODIURIL) 50 MG tablet Take 1 tablet (50 mg total) by mouth once daily. 30 tablet 0    ibuprofen (ADVIL,MOTRIN) 600 MG tablet Take 1 tablet (600 mg total) by mouth every 8 (eight) hours as needed for Pain. 15 tablet 0     No current facility-administered medications on file prior to visit.        Review of patient's allergies indicates:   Allergen Reactions    Erythromycin Other (See Comments)     hallucinations       OBJECTIVE:     Vital Signs:  BP (!) 132/94 (BP Location: Left arm, Patient Position: Sitting, BP Method: Small (Automatic))   Pulse 80   Wt 103.3 kg (227 lb 11.8 oz)   SpO2 97%   BMI 36.76 kg/m²   Wt Readings from Last 3 Encounters:   06/15/22 1025 103.3 kg (227 lb 11.8 oz)   06/07/22 1044 114.8 kg (253 lb)   06/06/22 2358 115  kg (253 lb 8.5 oz)   06/06/22 1611 104.3 kg (230 lb)   05/30/20 1222 100.7 kg (222 lb)     Body mass index is 36.76 kg/m².        Physical Exam:  BP (!) 132/94 (BP Location: Left arm, Patient Position: Sitting, BP Method: Small (Automatic))   Pulse 80   Wt 103.3 kg (227 lb 11.8 oz)   SpO2 97%   BMI 36.76 kg/m²   General appearance: alert, cooperative, no distress  Constitutional:Oriented to person, place, and time  + obese    HEENT: Normocephalic, atraumatic, neck symmetrical, no nasal discharge   Eyes: conjunctivae/corneas clear, PERRL, EOM's intact  Lungs: clear to auscultation bilaterally, no dullness to percussion bilaterally  Heart: regular rate and rhythm without rub; no displacement of the PMI   Abdomen: soft, non-tender; bowel sounds normoactive; no organomegaly  Extremities: extremities symmetric; no clubbing, cyanosis, or edema  Integument: Skin color, texture, turgor normal; no rashes; hair distrubution normal  Neurologic: Alert and oriented X 3, normal strength, normal coordination and gait  Psychiatric: no pressured speech; normal affect; no evidence of impaired cognition     Laboratory  Lab Results   Component Value Date    WBC 9.69 06/06/2022    HGB 16.3 06/06/2022    HCT 50.2 06/06/2022    MCV 85 06/06/2022     (L) 06/06/2022     BMP  Lab Results   Component Value Date     06/06/2022    K 4.5 06/06/2022     06/06/2022    CO2 23 06/06/2022    BUN 14 06/06/2022    CREATININE 1.0 06/06/2022    CALCIUM 9.7 06/06/2022    ANIONGAP 12 06/06/2022    ESTGFRAFRICA >60 06/06/2022    EGFRNONAA >60 06/06/2022     Lab Results   Component Value Date    ALT 35 06/06/2022    AST 24 06/06/2022    ALKPHOS 98 06/06/2022    BILITOT 0.6 06/06/2022     Lab Results   Component Value Date    INR 1.0 06/30/2011     Lab Results   Component Value Date    HGBA1C 6.4 (H) 10/21/2008       Diagnostic Results:     2 D echo 6/7/22:  · The left ventricle is normal in size with concentric remodeling and normal  systolic function.  · The estimated ejection fraction is 55%.  · Normal left ventricular diastolic function.  · The estimated PA systolic pressure is 8 mmHg.  · Normal right ventricular size with normal right ventricular systolic function.  · Normal central venous pressure (3 mmHg).    ASSESSMENT & PLAN:       Hypertensive emergency  - blood pressure greatly improved on current regimen  - ambulatory monitoring   - sodium restriction- counseling and education provided today    Other chest pain  - had chest pain in setting of uncontrolled HTN  - seen by hospital cardiology team- outpatient eval and stress test recommended  - will see Cardiology 6/20/22  -     Ambulatory referral/consult to Cardiology; Future; Expected date: 06/22/2022    Hyperglycemia  - HgBA1C 6.5 in 2008  -     Hemoglobin A1C; Future; Expected date: 06/15/2022  -     TSH; Future; Expected date: 06/15/2022    Morbid obesity  - BMI 36  - counseling and education provided    Tobacco dependence  - Previously smoked -> 1- 2 ppd x 35 years.  - Motivated to quit and has cut down significantly since hospitalization--  - Not using nicotine replacement- dose not feel he needs it at this time.  - will see tobacco cessation team 6/20/22        Addendum 6/15/22 12:50-   Labs reviewed.  HgBA1C 7  Unable to reach patient by phone. Portal message sent.  Order placed for diabetic education and diabetic supplies (sent to [facility])   Will discuss medication management and PCP follow up with patient when he returns call to office.       Instructions for the patient:      Scheduled Follow-up :  Future Appointments   Date Time Provider Department Center   6/20/2022 11:30 AM Daylin Robins [facility]   6/20/2022  2:20 PM Jakub Gold MD [id number]       Post Visit Medication List:     Medication List          Accurate as of Kyleigh 15, 2022  1:01 PM. If you have any questions, ask your nurse or doctor.            START taking  these medications    blood sugar diagnostic Strp  1 strip by Misc.(Non-Drug; Combo Route) route once daily at 6am.  Started by: Monserrat Moran MD     blood-glucose meter Misc  1 Device by Misc.(Non-Drug; Combo Route) route once daily at 6am.  Started by: Monserrat Moran MD     lancets Misc  1 Device by Misc.(Non-Drug; Combo Route) route once daily at 6am.  Started by: Monserrat Moran MD        CONTINUE taking these medications    amLODIPine 10 MG tablet  Commonly known as: NORVASC  Take 1 tablet (10 mg total) by mouth once daily.     aspirin 81 MG EC tablet  Commonly known as: ECOTRIN  Take 1 tablet (81 mg total) by mouth once daily.     hydroCHLOROthiazide 50 MG tablet  Commonly known as: HYDRODIURIL  Take 1 tablet (50 mg total) by mouth once daily.     ibuprofen 600 MG tablet  Commonly known as: ADVIL,MOTRIN  Take 1 tablet (600 mg total) by mouth every 8 (eight) hours as needed for Pain.           Where to Get Your Medications      [id number] [address] [place] [address] AT [facility] [address]  [address]  [address] [place] [id number]    Phone: [phone]   · blood sugar diagnostic Strp  · blood-glucose meter Misc  · lancets Misc         Signing Physician:  Monserrat Moran MD

## 2022-06-16 DIAGNOSIS — E11.65 TYPE 2 DIABETES MELLITUS WITH HYPERGLYCEMIA, WITHOUT LONG-TERM CURRENT USE OF INSULIN: Primary | ICD-10-CM

## 2022-06-16 RX ORDER — METFORMIN HYDROCHLORIDE 500 MG/1
500 TABLET ORAL 2 TIMES DAILY WITH MEALS
Qty: 60 TABLET | Refills: 3 | Status: SHIPPED | OUTPATIENT
Start: 2022-06-16 | End: 2023-06-16

## 2022-06-16 NOTE — PROGRESS NOTES
Discussed new Dx DM2 with patient via telephone.  HgBA1C 7.4.  TSH 1.805.   He is agreeable to start Metformin 500 mg po BID.  Counseled that he needs to follow up with his PCP, Dr Jim Silva, for further management.   Patient aware of referral placed to diabetic educator- he should expect a call to schedule this.  Patient aware that diabetic supplies (glucometer, test strips, lancets) sent to [facility].  Records will be shared with PCP for coordination of care.

## 2022-06-20 ENCOUNTER — TELEPHONE (OUTPATIENT)
Dept: SMOKING CESSATION | Facility: CLINIC | Age: 52
End: 2022-06-20
Payer: COMMERCIAL

## 2022-06-20 NOTE — TELEPHONE ENCOUNTER
Attempted to call patient for their smoking cessation appointment. Left a message with my contact information to reschedule the appointment  Daylin Robins [phone].

## 2022-07-05 ENCOUNTER — TELEPHONE (OUTPATIENT)
Dept: CARDIOLOGY | Facility: CLINIC | Age: 52
End: 2022-07-05
Payer: COMMERCIAL

## 2022-07-05 NOTE — TELEPHONE ENCOUNTER
----- Message from Parker Abraham sent at 7/5/2022 10:37 AM CDT -----  Type:  Needs Medical Advice    Who Called: patient  Would the patient rather a call back or a response via [facility]? call  Best Call Back Number: [phone]  Additional Information: He has a 10:40 got caught in the rain and is running about 10 minutes late.

## 2022-07-05 NOTE — TELEPHONE ENCOUNTER
Reached out to in regards to appt today.    Reports he is following with his PCP and declines to establish care with Cardiology at this time.    Encouraged to reach back to office when he is ready to establish care.    Verbalized understanding.

## 2022-08-18 ENCOUNTER — PATIENT MESSAGE (OUTPATIENT)
Dept: DIABETES | Facility: CLINIC | Age: 52
End: 2022-08-18
Payer: COMMERCIAL

## 2022-09-29 ENCOUNTER — TELEPHONE (OUTPATIENT)
Dept: DIABETES | Facility: CLINIC | Age: 52
End: 2022-09-29
Payer: COMMERCIAL

## 2023-11-27 ENCOUNTER — HOSPITAL ENCOUNTER (EMERGENCY)
Facility: HOSPITAL | Age: 53
Discharge: HOME | End: 2023-11-28
Attending: STUDENT IN AN ORGANIZED HEALTH CARE EDUCATION/TRAINING PROGRAM
Payer: COMMERCIAL

## 2023-11-27 DIAGNOSIS — R10.13 EPIGASTRIC PAIN: Primary | ICD-10-CM

## 2023-11-27 DIAGNOSIS — I10 HTN (HYPERTENSION): ICD-10-CM

## 2023-11-27 DIAGNOSIS — R07.9 CHEST PAIN: ICD-10-CM

## 2023-11-27 DIAGNOSIS — R79.89 ELEVATED TROPONIN: ICD-10-CM

## 2023-11-27 DIAGNOSIS — R11.0 NAUSEA: ICD-10-CM

## 2023-11-27 LAB
ALBUMIN SERPL BCP-MCNC: 3.7 G/DL (ref 3.5–5.2)
ALP SERPL-CCNC: 75 U/L (ref 55–135)
ALT SERPL W/O P-5'-P-CCNC: 21 U/L (ref 10–44)
AMPHET+METHAMPHET UR QL: NEGATIVE
ANION GAP SERPL CALC-SCNC: 12 MMOL/L (ref 8–16)
AST SERPL-CCNC: 22 U/L (ref 10–40)
BACTERIA #/AREA URNS HPF: NORMAL /HPF
BARBITURATES UR QL SCN>200 NG/ML: NEGATIVE
BASOPHILS # BLD AUTO: 0.04 K/UL (ref 0–0.2)
BASOPHILS NFR BLD: 0.6 % (ref 0–1.9)
BENZODIAZ UR QL SCN>200 NG/ML: NEGATIVE
BILIRUB SERPL-MCNC: 0.4 MG/DL (ref 0.1–1)
BILIRUB UR QL STRIP: NEGATIVE
BNP SERPL-MCNC: 102 PG/ML (ref 0–99)
BUN SERPL-MCNC: 11 MG/DL (ref 6–20)
BZE UR QL SCN: NEGATIVE
CALCIUM SERPL-MCNC: 9.6 MG/DL (ref 8.7–10.5)
CANNABINOIDS UR QL SCN: NEGATIVE
CHLORIDE SERPL-SCNC: 101 MMOL/L (ref 95–110)
CLARITY UR: CLEAR
CO2 SERPL-SCNC: 24 MMOL/L (ref 23–29)
COLOR UR: YELLOW
CREAT SERPL-MCNC: 1.3 MG/DL (ref 0.5–1.4)
CREAT UR-MCNC: 321.7 MG/DL (ref 23–375)
DIFFERENTIAL METHOD: ABNORMAL
EOSINOPHIL # BLD AUTO: 0.1 K/UL (ref 0–0.5)
EOSINOPHIL NFR BLD: 1.3 % (ref 0–8)
ERYTHROCYTE [DISTWIDTH] IN BLOOD BY AUTOMATED COUNT: 14 % (ref 11.5–14.5)
EST. GFR  (NO RACE VARIABLE): >60 ML/MIN/1.73 M^2
GIANT PLATELETS BLD QL SMEAR: PRESENT
GLUCOSE SERPL-MCNC: 169 MG/DL (ref 70–110)
GLUCOSE UR QL STRIP: ABNORMAL
HCT VFR BLD AUTO: 45.3 % (ref 40–54)
HGB BLD-MCNC: 15.3 G/DL (ref 14–18)
HGB UR QL STRIP: ABNORMAL
HYALINE CASTS #/AREA URNS LPF: 0 /LPF
IMM GRANULOCYTES # BLD AUTO: 0.04 K/UL (ref 0–0.04)
IMM GRANULOCYTES NFR BLD AUTO: 0.6 % (ref 0–0.5)
KETONES UR QL STRIP: ABNORMAL
LACTATE SERPL-SCNC: 1.4 MMOL/L (ref 0.5–2.2)
LEUKOCYTE ESTERASE UR QL STRIP: NEGATIVE
LIPASE SERPL-CCNC: 47 U/L (ref 4–60)
LYMPHOCYTES # BLD AUTO: 1.3 K/UL (ref 1–4.8)
LYMPHOCYTES NFR BLD: 18.3 % (ref 18–48)
MAGNESIUM SERPL-MCNC: 1.7 MG/DL (ref 1.6–2.6)
MCH RBC QN AUTO: 28.2 PG (ref 27–31)
MCHC RBC AUTO-ENTMCNC: 33.8 G/DL (ref 32–36)
MCV RBC AUTO: 83 FL (ref 82–98)
METHADONE UR QL SCN>300 NG/ML: NEGATIVE
MICROSCOPIC COMMENT: NORMAL
MONOCYTES # BLD AUTO: 0.6 K/UL (ref 0.3–1)
MONOCYTES NFR BLD: 8.9 % (ref 4–15)
NEUTROPHILS # BLD AUTO: 5 K/UL (ref 1.8–7.7)
NEUTROPHILS NFR BLD: 70.3 % (ref 38–73)
NITRITE UR QL STRIP: NEGATIVE
NRBC BLD-RTO: 0 /100 WBC
OPIATES UR QL SCN: NEGATIVE
PCP UR QL SCN>25 NG/ML: NEGATIVE
PH UR STRIP: 6 [PH] (ref 5–8)
PLATELET # BLD AUTO: 108 K/UL (ref 150–450)
PLATELET BLD QL SMEAR: ABNORMAL
PMV BLD AUTO: 13.9 FL (ref 9.2–12.9)
POTASSIUM SERPL-SCNC: 3.6 MMOL/L (ref 3.5–5.1)
PROT SERPL-MCNC: 7.4 G/DL (ref 6–8.4)
PROT UR QL STRIP: ABNORMAL
RBC # BLD AUTO: 5.43 M/UL (ref 4.6–6.2)
RBC #/AREA URNS HPF: 3 /HPF (ref 0–4)
SODIUM SERPL-SCNC: 137 MMOL/L (ref 136–145)
SP GR UR STRIP: >1.03 (ref 1–1.03)
SQUAMOUS #/AREA URNS HPF: 3 /HPF
TOXICOLOGY INFORMATION: NORMAL
TROPONIN I SERPL DL<=0.01 NG/ML-MCNC: 0.04 NG/ML (ref 0–0.03)
URN SPEC COLLECT METH UR: ABNORMAL
UROBILINOGEN UR STRIP-ACNC: NEGATIVE EU/DL
WBC # BLD AUTO: 7.1 K/UL (ref 3.9–12.7)
WBC #/AREA URNS HPF: 1 /HPF (ref 0–5)

## 2023-11-27 PROCEDURE — 83880 ASSAY OF NATRIURETIC PEPTIDE: CPT | Performed by: STUDENT IN AN ORGANIZED HEALTH CARE EDUCATION/TRAINING PROGRAM

## 2023-11-27 PROCEDURE — 93010 EKG 12-LEAD: ICD-10-PCS | Mod: ,,, | Performed by: INTERNAL MEDICINE

## 2023-11-27 PROCEDURE — 83690 ASSAY OF LIPASE: CPT | Performed by: STUDENT IN AN ORGANIZED HEALTH CARE EDUCATION/TRAINING PROGRAM

## 2023-11-27 PROCEDURE — 63600175 PHARM REV CODE 636 W HCPCS: Performed by: STUDENT IN AN ORGANIZED HEALTH CARE EDUCATION/TRAINING PROGRAM

## 2023-11-27 PROCEDURE — 84484 ASSAY OF TROPONIN QUANT: CPT | Performed by: STUDENT IN AN ORGANIZED HEALTH CARE EDUCATION/TRAINING PROGRAM

## 2023-11-27 PROCEDURE — 96374 THER/PROPH/DIAG INJ IV PUSH: CPT

## 2023-11-27 PROCEDURE — 83735 ASSAY OF MAGNESIUM: CPT | Performed by: STUDENT IN AN ORGANIZED HEALTH CARE EDUCATION/TRAINING PROGRAM

## 2023-11-27 PROCEDURE — 80053 COMPREHEN METABOLIC PANEL: CPT | Performed by: STUDENT IN AN ORGANIZED HEALTH CARE EDUCATION/TRAINING PROGRAM

## 2023-11-27 PROCEDURE — 96375 TX/PRO/DX INJ NEW DRUG ADDON: CPT

## 2023-11-27 PROCEDURE — 96361 HYDRATE IV INFUSION ADD-ON: CPT

## 2023-11-27 PROCEDURE — 99285 EMERGENCY DEPT VISIT HI MDM: CPT | Mod: 25

## 2023-11-27 PROCEDURE — 81000 URINALYSIS NONAUTO W/SCOPE: CPT | Mod: 59 | Performed by: STUDENT IN AN ORGANIZED HEALTH CARE EDUCATION/TRAINING PROGRAM

## 2023-11-27 PROCEDURE — 25000003 PHARM REV CODE 250: Performed by: STUDENT IN AN ORGANIZED HEALTH CARE EDUCATION/TRAINING PROGRAM

## 2023-11-27 PROCEDURE — 93010 ELECTROCARDIOGRAM REPORT: CPT | Mod: ,,, | Performed by: INTERNAL MEDICINE

## 2023-11-27 PROCEDURE — 83605 ASSAY OF LACTIC ACID: CPT | Performed by: STUDENT IN AN ORGANIZED HEALTH CARE EDUCATION/TRAINING PROGRAM

## 2023-11-27 PROCEDURE — 93005 ELECTROCARDIOGRAM TRACING: CPT

## 2023-11-27 PROCEDURE — 85025 COMPLETE CBC W/AUTO DIFF WBC: CPT | Performed by: STUDENT IN AN ORGANIZED HEALTH CARE EDUCATION/TRAINING PROGRAM

## 2023-11-27 PROCEDURE — 80307 DRUG TEST PRSMV CHEM ANLYZR: CPT | Performed by: STUDENT IN AN ORGANIZED HEALTH CARE EDUCATION/TRAINING PROGRAM

## 2023-11-27 RX ORDER — ONDANSETRON 2 MG/ML
4 INJECTION INTRAMUSCULAR; INTRAVENOUS ONCE
Status: COMPLETED | OUTPATIENT
Start: 2023-11-27 | End: 2023-11-27

## 2023-11-27 RX ORDER — ASPIRIN 325 MG
325 TABLET ORAL
Status: COMPLETED | OUTPATIENT
Start: 2023-11-27 | End: 2023-11-28

## 2023-11-27 RX ORDER — KETOROLAC TROMETHAMINE 30 MG/ML
15 INJECTION, SOLUTION INTRAMUSCULAR; INTRAVENOUS
Status: COMPLETED | OUTPATIENT
Start: 2023-11-27 | End: 2023-11-27

## 2023-11-27 RX ORDER — MORPHINE SULFATE 2 MG/ML
6 INJECTION, SOLUTION INTRAMUSCULAR; INTRAVENOUS
Status: COMPLETED | OUTPATIENT
Start: 2023-11-27 | End: 2023-11-28

## 2023-11-27 RX ORDER — NITROGLYCERIN 0.4 MG/1
0.4 TABLET SUBLINGUAL EVERY 5 MIN PRN
Status: DISCONTINUED | OUTPATIENT
Start: 2023-11-28 | End: 2023-11-28 | Stop reason: HOSPADM

## 2023-11-27 RX ADMIN — SODIUM CHLORIDE 1000 ML: 9 INJECTION, SOLUTION INTRAVENOUS at 09:11

## 2023-11-27 RX ADMIN — ONDANSETRON 4 MG: 2 INJECTION INTRAMUSCULAR; INTRAVENOUS at 09:11

## 2023-11-27 RX ADMIN — KETOROLAC TROMETHAMINE 15 MG: 30 INJECTION, SOLUTION INTRAMUSCULAR at 09:11

## 2023-11-28 VITALS
SYSTOLIC BLOOD PRESSURE: 195 MMHG | BODY MASS INDEX: 38.09 KG/M2 | WEIGHT: 236 LBS | OXYGEN SATURATION: 95 % | TEMPERATURE: 98 F | HEART RATE: 64 BPM | RESPIRATION RATE: 22 BRPM | DIASTOLIC BLOOD PRESSURE: 89 MMHG

## 2023-11-28 LAB — TROPONIN I SERPL DL<=0.01 NG/ML-MCNC: 0.04 NG/ML (ref 0–0.03)

## 2023-11-28 PROCEDURE — 84484 ASSAY OF TROPONIN QUANT: CPT | Performed by: STUDENT IN AN ORGANIZED HEALTH CARE EDUCATION/TRAINING PROGRAM

## 2023-11-28 PROCEDURE — 25000242 PHARM REV CODE 250 ALT 637 W/ HCPCS: Performed by: STUDENT IN AN ORGANIZED HEALTH CARE EDUCATION/TRAINING PROGRAM

## 2023-11-28 PROCEDURE — 63600175 PHARM REV CODE 636 W HCPCS: Performed by: STUDENT IN AN ORGANIZED HEALTH CARE EDUCATION/TRAINING PROGRAM

## 2023-11-28 PROCEDURE — 25000003 PHARM REV CODE 250: Performed by: STUDENT IN AN ORGANIZED HEALTH CARE EDUCATION/TRAINING PROGRAM

## 2023-11-28 RX ORDER — OXYCODONE AND ACETAMINOPHEN 5; 325 MG/1; MG/1
1 TABLET ORAL EVERY 6 HOURS PRN
Qty: 12 TABLET | Refills: 0 | Status: SHIPPED | OUTPATIENT
Start: 2023-11-28

## 2023-11-28 RX ORDER — PANTOPRAZOLE SODIUM 40 MG/1
40 TABLET, DELAYED RELEASE ORAL DAILY
Qty: 30 TABLET | Refills: 0 | Status: SHIPPED | OUTPATIENT
Start: 2023-11-28 | End: 2023-12-28

## 2023-11-28 RX ORDER — ONDANSETRON 4 MG/1
4 TABLET, FILM COATED ORAL EVERY 6 HOURS PRN
Qty: 12 TABLET | Refills: 0 | Status: SHIPPED | OUTPATIENT
Start: 2023-11-28

## 2023-11-28 RX ADMIN — ASPIRIN 325 MG ORAL TABLET 325 MG: 325 PILL ORAL at 12:11

## 2023-11-28 RX ADMIN — MORPHINE SULFATE 6 MG: 2 INJECTION, SOLUTION INTRAMUSCULAR; INTRAVENOUS at 12:11

## 2023-11-28 RX ADMIN — NITROGLYCERIN 0.4 MG: 0.4 TABLET, ORALLY DISINTEGRATING SUBLINGUAL at 12:11

## 2023-11-28 NOTE — ED NOTES
Ambulatory to room 6. Pt. Is pacing, anxious and hyperventilating. He reports mild uri symptoms that began 3 days ago. He was seen by PCP today and prescribed Z-pack. He took first dose approx. 4hrs. Ago he now c/o diffuse upper abdominal pain radiating up into chest and sob. Denies n/v and was able to eat dinner tonight. He pin points worst pain across upper abdomen. Pt. Is anxious and restless.

## 2023-11-28 NOTE — ED NOTES
"Patient states pain relief with nitro SL. Patient stating he still has "abdominal pain" and requesting to speak to MD. MD made aware.   "

## 2023-11-28 NOTE — DISCHARGE INSTRUCTIONS
Thank you for coming to our Emergency Department today. It is important to remember that some problems are difficult to diagnose and may not be found during your first visit. Be sure to follow up with your primary care doctor and review any labs/imaging that was performed with them. If you do not have a primary care doctor, you may contact the one listed on your discharge paperwork or you may also call the [facility] Appointment Desk at [phone] to schedule an appointment with one.     All medications may potentially have side effects and it is impossible to predict which medications may give you side effects. If you feel that you are having a negative effect of any medication you should immediately stop taking them and seek medical attention.    Return to the ER with any questions/concerns, new/concerning symptoms, worsening or failure to improve. Do not drive or make any important decisions for 24 hours if you have received any pain medications, sedatives or mood altering drugs during your ER visit.

## 2023-11-28 NOTE — ED PROVIDER NOTES
"Encounter Date: 11/27/2023       History     Chief Complaint   Patient presents with    Chest Pain    Abdominal Pain     Patient states he just took azithromycin about 2 hours prior and he suddenly started having chest pressure, upper abd pain and feeling short of breath. Patient appears very anxious in triage. He is complaining of feeling very hot and nauseated.      53-year-old male history of hypertension presents to emergency department with chest pain, shortness of breath, epigastric abdominal pain,and nausea that began approximately 3 hours prior to ED presentation.  Patient reports earlier in the day he had left over Thanksgiving food that smelled spoiled and subsequently began having dry heaving and noting his blood pressure was elevated.  He reports attempting to self induce emesis but was unable to do so.  He denies any diarrhea, denies any fevers or chills.  Furthermore he reports recently been diagnosed with a upper respiratory infection and has been taking azithromycin which worsened his pain.  Currently reports his pain is "15/10"    The history is provided by the patient and the spouse.     Review of patient's allergies indicates:   Allergen Reactions    Erythromycin Other (See Comments)     hallucinations     Past Medical History:   Diagnosis Date    Hypertension      No past surgical history on file.  Family History   Problem Relation Age of Onset    Heart disease Mother     Hypertension Mother      Social History     Tobacco Use    Smoking status: Every Day     Types: Cigarettes     Start date: 1984    Smokeless tobacco: Never    Tobacco comments:     Enrolled in the [place] [employer] on 6/7/22.  Ambulatory referral to Smoking Cessation jacqueline.   Substance Use Topics    Alcohol use: Yes    Drug use: No     Review of Systems   Constitutional:  Negative for chills and fever.   HENT:  Negative for congestion and rhinorrhea.    Eyes:  Negative for pain.   Respiratory:  Positive for shortness of breath. " Negative for cough.    Cardiovascular:  Positive for chest pain. Negative for leg swelling.   Gastrointestinal:  Positive for abdominal pain and nausea. Negative for vomiting.   Genitourinary:  Negative for dysuria and hematuria.   Musculoskeletal:  Negative for joint swelling and neck pain.   Skin:  Negative for rash.   Neurological:  Negative for weakness and headaches.       Physical Exam     Initial Vitals [11/27/23 2028]   BP Pulse Resp Temp SpO2   (!) 189/104 72 (!) 24 98.3 °F (36.8 °C) 99 %      MAP       --         Physical Exam    Constitutional: He appears well-developed and well-nourished. He is not diaphoretic. No distress.   HENT:   Head: Normocephalic and atraumatic.   Eyes: Conjunctivae and EOM are normal. Pupils are equal, round, and reactive to light.   Neck:   Normal range of motion.  Cardiovascular:  Regular rhythm.           Pulses:       Radial pulses are 2+ on the right side and 2+ on the left side.        Posterior tibial pulses are 2+ on the right side and 2+ on the left side.   Pulmonary/Chest: Breath sounds normal. No respiratory distress.   Abdominal: Abdomen is soft. Bowel sounds are normal. He exhibits no distension. There is abdominal tenderness. There is no rebound and no guarding.   Musculoskeletal:         General: No tenderness. Normal range of motion.      Cervical back: Normal range of motion.     Lymphadenopathy:     He has no cervical adenopathy.   Neurological: He is alert and oriented to person, place, and time.   Skin: Skin is warm. Capillary refill takes less than 2 seconds.   Psychiatric: His behavior is normal.         ED Course   Procedures  Labs Reviewed   CBC W/ AUTO DIFFERENTIAL - Abnormal; Notable for the following components:       Result Value    Platelets 108 (*)     MPV 13.9 (*)     Immature Granulocytes 0.6 (*)     Platelet Estimate Decreased (*)     All other components within normal limits   COMPREHENSIVE METABOLIC PANEL - Abnormal; Notable for the following  components:    Glucose 169 (*)     All other components within normal limits   URINALYSIS, REFLEX TO URINE CULTURE - Abnormal; Notable for the following components:    Specific Gravity, UA >1.030 (*)     Protein, UA 1+ (*)     Glucose, UA 1+ (*)     Ketones, UA Trace (*)     Occult Blood UA 1+ (*)     All other components within normal limits    Narrative:     Specimen Source->Urine   TROPONIN I - Abnormal; Notable for the following components:    Troponin I 0.044 (*)     All other components within normal limits   B-TYPE NATRIURETIC PEPTIDE - Abnormal; Notable for the following components:     (*)     All other components within normal limits   TROPONIN I - Abnormal; Notable for the following components:    Troponin I 0.037 (*)     All other components within normal limits   LIPASE   MAGNESIUM   LACTIC ACID, PLASMA   URINALYSIS MICROSCOPIC    Narrative:     Specimen Source->Urine   DRUG SCREEN PANEL, URINE EMERGENCY   DRUG SCREEN PANEL, URINE EMERGENCY    Narrative:     Specimen Source->Urine          Imaging Results              X-Ray Chest AP Portable (Final result)  Result time 11/27/23 23:14:13      Final result by Yasmani Mckee MD (11/27/23 23:14:13)                   Impression:      Hypoventilatory changes with no convincing evidence of acute chest disease since the prior exam.      Electronically signed by: Yasmani Mckee  Date:    11/27/2023  Time:    23:14               Narrative:    EXAMINATION:  XR CHEST AP PORTABLE    CLINICAL HISTORY:  Chest pain, unspecified    TECHNIQUE:  Single frontal view of the chest was performed.    COMPARISON:  06/06/2022    FINDINGS:  The heart is stable in size and configuration with mild left ventricular configuration.  The trachea is midline.  The lungs and pleural spaces are clear allowing for hypoventilatory changes.  Bony structures are intact.                                       Medications   ondansetron injection 4 mg (4 mg Intravenous Given 11/27/23  2133)   sodium chloride 0.9% bolus 1,000 mL 1,000 mL (0 mLs Intravenous Stopped 11/28/23 0016)   ketorolac injection 15 mg (15 mg Intravenous Given 11/27/23 2155)   aspirin tablet 325 mg (325 mg Oral Given 11/28/23 0004)   morphine injection 6 mg (6 mg Intravenous Given 11/28/23 0005)     Medical Decision Making:   Initial Assessment:   53-year-old male history of hypertension presents to emergency department with chest pain, shortness of breath, epigastric abdominal pain, nausea without vomiting.  Patient in mild distress secondary to pain.  Vitals notable for elevated blood pressure otherwise unremarkable.  EKG with nonspecific ST and T-wave abnormalities.  No STEMI.  No evidence of volume overload on exam.  Given patient's reported history of ingesting possible spoke food suspect symptoms may be secondary to acute gastritis.  But given his age, hypertension and chest pain will obtain a cardiac workup.  Will reassess after labs and imaging.  Differential Diagnosis:   Differential Diagnosis includes, but is not limited to:  ACS/MI, PE, aortic dissection, pneumothorax, cardiac tamponade, pericarditis/myocarditis, pneumonia, infection/abscess, lung mass, trauma/fracture, costochondritis/pleurisy, MSK pain/contusion, GERD, biliary disease, pancreatitis, anemia    Clinical Tests:   Lab Tests: Ordered and Reviewed  Radiological Study: Ordered and Reviewed  Medical Tests: Ordered and Reviewed             ED Course as of 11/28/23 0450   Mon Nov 27, 2023 2112 Independent Interpretation of EKG:  Rhythm: Sinus   Rate: 65  QTC: 445  No STEMI  [AS]   2210 BNP(!): 102 [AS]   2210 Troponin I(!)  Initial troponin 0.04 will repeat a delta.  Patient reports pain has improved after Toradol currently rates it 8/10 and no longer feels nauseous.  Currently receiving IV fluids. [AS]   2211 Comprehensive metabolic panel(!)  Chem 14 negative for hypo-or hyper natremia, kalemia , chloridemia, or other electrolyte abnormalities; BUN and  creatinine (at baseline), ALT and AST were within normal limits indicating normal liver function.   [AS]   2212 CBC auto differential(!)  CBC with out leukocytosis or acute anemia.  Platelets downtrending from prior. [AS]   2212 Troponin I(!)  Repeat troponin downtrending repeat EKG without any acute abnormalities.  Had a discussion with patient regarding admission for evaluation and further observation but patient declined.  He reports he feels much better currently EN rather follow-up with his primary care doctor.  Prescription for pain medicine and nausea medicine prescribed.  Strict return precautions and anticipatory guidance discussed.  All questions answered. [AS]   Tue Nov 28, 2023   0106 Patient reports his symptoms have resolved and wants to go home. Pt is currently stable for discharge. I see no indication of an emergent process beyond that addressed during our encounter but have duly counseled the patient/family regarding the need for prompt follow-up as well as the indications that should prompt immediate return to the emergency room should new or worrisome developments occur. I discussed the ED work up and diagnostic findings with the patient/family. The patient/family has been provided with verbal and printed direction regarding our final diagnosis(es) as well as instructions regarding use of OTC and/or Rx medications intended to manage the patient's aforementioned conditions. The patient/family verbalized an understanding. The patient/family is asked if there are any questions or concerns. We discuss the case, until all issues are addressed to the patient/family's satisfaction. Patient/family understands and is agreeable to the plan.   [AS]      ED Course User Index  [AS] Gerardo Montenegro MD          Medical Decision Making  Amount and/or Complexity of Data Reviewed  Labs: ordered. Decision-making details documented in ED Course.  Radiology: ordered.    Risk  OTC drugs.  Prescription drug  management.         Critical Care Procedure Note  Authorized and Performed by: Gerardo Montenegro MD  Total critical care time: 35 minutes  Due to a high probability of clinically significant, life threatening deterioration, the patient required my highest level of preparedness to intervene emergently and I personally spent this critical care time directly and personally managing the patient. This critical care time included obtaining a history; examining the patient; pulse oximetry; ordering and review of studies; arranging urgent treatment with development of a management plan; evaluation of patient's response to treatment; frequent reassessment; and, discussions with other providers.  This critical care time was performed to assess and manage the high probability of imminent, life-threatening deterioration that could result in multi-organ failure. It was exclusive of separately billable procedures and treating other patients and teaching time.  Please see MDM section and the rest of the note for further information on patient assessment and treatment.    Clinical Impression:   Final diagnoses:  [R07.9] Chest pain  [I10] HTN (hypertension)  [R10.13] Epigastric pain (Primary)  [R11.0] Nausea  [R79.89] Elevated troponin          ED Disposition Condition    Discharge Stable          ED Prescriptions       Medication Sig Dispense Start Date End Date Auth. Provider    ondansetron (ZOFRAN) 4 MG tablet Take 1 tablet (4 mg total) by mouth every 6 (six) hours as needed for Nausea. 12 tablet 11/28/2023 -- Gerardo Montenegro MD    oxyCODONE-acetaminophen (PERCOCET) 5-325 mg per tablet Take 1 tablet by mouth every 6 (six) hours as needed for Pain. 12 tablet 11/28/2023 -- Gerardo Montenegro MD    pantoprazole (PROTONIX) 40 MG tablet Take 1 tablet (40 mg total) by mouth once daily. 30 tablet 11/28/2023 12/28/2023 Gerardo Montenegro MD          Follow-up Information       Follow up With Specialties Details Why Contact Ekta Silva  Jim PERRY MD Family Medicine Schedule an appointment as soon as possible for a visit  for reassesment [address] [facility] [address]  [phone]      [facility] Emergency Dept Emergency Medicine  If symptoms worsen [address]  [facility] [id number] [phone]            DISCLAIMER: This note was prepared with [employer] voice recognition transcription software. Garbled syntax, mangled pronouns, and other bizarre constructions may be attributed to that software system.     Gerardo Montenegro MD  11/28/23 9592

## 2024-10-11 ENCOUNTER — OFFICE VISIT (OUTPATIENT)
Dept: OPTOMETRY | Facility: CLINIC | Age: 54
End: 2024-10-11
Payer: COMMERCIAL

## 2024-10-11 DIAGNOSIS — E11.9 DIABETES MELLITUS TYPE 2 WITHOUT RETINOPATHY: Primary | ICD-10-CM

## 2024-10-11 DIAGNOSIS — H43.393 VITREOUS FLOATERS OF BOTH EYES: ICD-10-CM

## 2024-10-11 DIAGNOSIS — H52.203 ASTIGMATISM OF BOTH EYES WITH PRESBYOPIA: ICD-10-CM

## 2024-10-11 DIAGNOSIS — H52.4 ASTIGMATISM OF BOTH EYES WITH PRESBYOPIA: ICD-10-CM

## 2024-10-11 PROCEDURE — 99999 PR PBB SHADOW E&M-EST. PATIENT-LVL II: CPT | Mod: PBBFAC,,, | Performed by: OPTOMETRIST

## 2024-10-11 NOTE — PROGRESS NOTES
HPI    BISI: last year with outside provider   Last DFE: last year   Chief complaint (CC): 55 yo M here for annual eye exam. Pt denies any   ocular or visual complaints today.   Glasses? +   Contacts? -  H/o eye surgery, injections or laser: -  H/o eye injury: -  Known eye conditions? -  Family h/o eye conditions? -  Eye gtts? -      (-) Flashes (+++)  Floaters (-) Mucous   (-)  Tearing (-) Itching (-) Burning   (-) Headaches (-) Eye Pain/discomfort (-) Irritation   (-)  Redness (-) Double vision (-) Blurry vision    CL Exam: No    Diabetic? +  A1c? Lab Results       Component                Value               Date                       HGBA1C                   7.4 (H)             06/15/2022              Last edited by Karly Storey, OD on 10/11/2024  3:33 PM.            Assessment /Plan     For exam results, see Encounter Report.        Diabetes mellitus type 2 without retinopathy   - Pt educated on the importance of maintaining adequate glycemic and blood pressure control via diet, compliance with medications, exercise and timely follow up with PCP.  No diabetic retinopathy, No CSME.   - Return in 1 year for dilated eye exam.    Vitreous floaters of both eyes   - No evidence of holes, tears or detachment of retina. Negative Shafers sign in vitreous. Patient educated on signs and symptoms of retinal detachment and advised to return to clinic immediately if symptoms arise.    Astigmatism of both eyes with presbyopia   - New Spectacle Rx given, discussed different options for glasses.  - RTC 1 year for routine eye exam.

## 2024-10-15 ENCOUNTER — OFFICE VISIT (OUTPATIENT)
Dept: SPORTS MEDICINE | Facility: CLINIC | Age: 54
End: 2024-10-15
Payer: COMMERCIAL

## 2024-10-15 ENCOUNTER — HOSPITAL ENCOUNTER (OUTPATIENT)
Dept: RADIOLOGY | Facility: HOSPITAL | Age: 54
Discharge: HOME | End: 2024-10-15
Attending: ORTHOPAEDIC SURGERY
Payer: COMMERCIAL

## 2024-10-15 VITALS
HEART RATE: 76 BPM | DIASTOLIC BLOOD PRESSURE: 96 MMHG | HEIGHT: 66 IN | WEIGHT: 235.88 LBS | BODY MASS INDEX: 37.91 KG/M2 | SYSTOLIC BLOOD PRESSURE: 196 MMHG

## 2024-10-15 DIAGNOSIS — M25.512 ARTHRALGIA OF LEFT ACROMIOCLAVICULAR JOINT: Primary | ICD-10-CM

## 2024-10-15 DIAGNOSIS — M25.512 LEFT SHOULDER PAIN, UNSPECIFIED CHRONICITY: ICD-10-CM

## 2024-10-15 DIAGNOSIS — R52 PAIN: Primary | ICD-10-CM

## 2024-10-15 DIAGNOSIS — G89.29 CHRONIC LEFT SHOULDER PAIN: ICD-10-CM

## 2024-10-15 DIAGNOSIS — M25.512 CHRONIC LEFT SHOULDER PAIN: ICD-10-CM

## 2024-10-15 PROCEDURE — 73030 X-RAY EXAM OF SHOULDER: CPT | Mod: TC,LT

## 2024-10-15 PROCEDURE — 3077F SYST BP >= 140 MM HG: CPT | Mod: CPTII,S$GLB,, | Performed by: ORTHOPAEDIC SURGERY

## 2024-10-15 PROCEDURE — 99999 PR PBB SHADOW E&M-EST. PATIENT-LVL III: CPT | Mod: PBBFAC,,, | Performed by: ORTHOPAEDIC SURGERY

## 2024-10-15 PROCEDURE — 99204 OFFICE O/P NEW MOD 45 MIN: CPT | Mod: S$GLB,,, | Performed by: ORTHOPAEDIC SURGERY

## 2024-10-15 PROCEDURE — 73030 X-RAY EXAM OF SHOULDER: CPT | Mod: 26,LT,, | Performed by: RADIOLOGY

## 2024-10-15 PROCEDURE — 1159F MED LIST DOCD IN RCRD: CPT | Mod: CPTII,S$GLB,, | Performed by: ORTHOPAEDIC SURGERY

## 2024-10-15 PROCEDURE — 3080F DIAST BP >= 90 MM HG: CPT | Mod: CPTII,S$GLB,, | Performed by: ORTHOPAEDIC SURGERY

## 2024-10-15 PROCEDURE — 3008F BODY MASS INDEX DOCD: CPT | Mod: CPTII,S$GLB,, | Performed by: ORTHOPAEDIC SURGERY

## 2024-10-15 RX ORDER — SEMAGLUTIDE 0.68 MG/ML
INJECTION, SOLUTION SUBCUTANEOUS
COMMUNITY
Start: 2024-10-08 | End: 2024-11-04

## 2024-10-17 ENCOUNTER — HOSPITAL ENCOUNTER (OUTPATIENT)
Dept: RADIOLOGY | Facility: OTHER | Age: 54
Discharge: HOME | End: 2024-10-17
Attending: SPECIALIST/TECHNOLOGIST
Payer: COMMERCIAL

## 2024-10-17 ENCOUNTER — OFFICE VISIT (OUTPATIENT)
Dept: ORTHOPEDICS | Facility: CLINIC | Age: 54
End: 2024-10-17
Payer: COMMERCIAL

## 2024-10-17 DIAGNOSIS — M15.2 OSTEOARTHRITIS OF PROXIMAL INTERPHALANGEAL (PIP) JOINT OF LEFT RING FINGER: ICD-10-CM

## 2024-10-17 DIAGNOSIS — R52 PAIN: ICD-10-CM

## 2024-10-17 DIAGNOSIS — M79.642 CHRONIC PAIN OF LEFT HAND: Primary | ICD-10-CM

## 2024-10-17 DIAGNOSIS — G89.29 CHRONIC PAIN OF LEFT HAND: Primary | ICD-10-CM

## 2024-10-17 DIAGNOSIS — M15.2 OSTEOARTHRITIS OF PROXIMAL INTERPHALANGEAL (PIP) JOINT OF LEFT MIDDLE FINGER: ICD-10-CM

## 2024-10-17 PROCEDURE — 73130 X-RAY EXAM OF HAND: CPT | Mod: TC,FY,LT

## 2024-10-17 PROCEDURE — 1159F MED LIST DOCD IN RCRD: CPT | Mod: CPTII,S$GLB,, | Performed by: SPECIALIST/TECHNOLOGIST

## 2024-10-17 PROCEDURE — 73130 X-RAY EXAM OF HAND: CPT | Mod: 26,LT,, | Performed by: RADIOLOGY

## 2024-10-17 PROCEDURE — 99204 OFFICE O/P NEW MOD 45 MIN: CPT | Mod: S$GLB,,, | Performed by: SPECIALIST/TECHNOLOGIST

## 2024-10-17 PROCEDURE — 99999 PR PBB SHADOW E&M-EST. PATIENT-LVL III: CPT | Mod: PBBFAC,,, | Performed by: SPECIALIST/TECHNOLOGIST

## 2024-10-17 PROCEDURE — 1160F RVW MEDS BY RX/DR IN RCRD: CPT | Mod: CPTII,S$GLB,, | Performed by: SPECIALIST/TECHNOLOGIST

## 2024-10-17 NOTE — PROGRESS NOTES
[facility]  History & Physical  Orthopedics    Subjective:      Chief Complaint: Numbness and Pain of the Left Hand    Referring Provider: DANK Wilson MD     History of Present Illness:  Carmelo Castle Jr. is a 54 y.o. right hand dominant male who presents to clinic today with left hand pain. Patient first noticed pain in the PIP joint of the middle and ring fingers 5-6 months ago and he is now starting to get pain in the index PIP joint. He reports that whenever he flexes the fingers into a fist or makes  motion, he gets a  2/10 pain in the PIP joints and MCP tightness with clicking between 3rd and 4th metacarpals on dorsum of hand. He does not have any of these symptoms when at rest. Patient is a  and states he cannot carry as many bottles in one hand as he could before. Denies any numbness or tingling, but has experienced one short episode of tingling in the fingertips. He denies locking or triggering of the fingers. Patient denies any prior trauma or history of OT, corticosteroid injections, or surgeries to the wrist or hand.  The patient denies any fevers, chills, N/V, D/C and presents for evaluation.    Vitals:    10/17/24 1304   PainSc:   4   PainLoc: Hand       Past Medical History:   Diagnosis Date    Diabetes mellitus     Hypertension      History reviewed. No pertinent surgical history.  Family History   Problem Relation Name Age of Onset    Heart disease Mother      Hypertension Mother       Social History     Socioeconomic History    Marital status:    Tobacco Use    Smoking status: Every Day     Types: Cigarettes     Start date: 1984    Smokeless tobacco: Never    Tobacco comments:     [place] in the [place] [employer] on 6[place]22.  Ambulatory referral to Smoking Cessation jacqueline.   Substance and Sexual Activity    Alcohol use: Yes    Drug use: No    Sexual activity: Yes     Social Drivers of Health     Financial Resource Strain: Medium Risk (10/11/2024)     Overall Financial Resource Strain (CARDIA)     Difficulty of Paying Living Expenses: Somewhat hard   Food Insecurity: No Food Insecurity (10/11/2024)    Hunger Vital Sign     Worried About Running Out of Food in the Last Year: Never true     Ran Out of Food in the Last Year: Never true   Physical Activity: Inactive (10/11/2024)    Exercise Vital Sign     Days of Exercise per Week: 0 days     Minutes of Exercise per Session: 0 min   Stress: Stress Concern Present (10/11/2024)    [place] [employer] - Occupational Stress Questionnaire     Feeling of Stress : Rather much   Housing Stability: Unknown (10/11/2024)    Housing Stability Vital Sign     Unable to Pay for Housing in the Last Year: No       Current Outpatient Medications   Medication Sig Dispense Refill    blood sugar diagnostic Strp 1 strip by Misc.(Non-Drug; Combo Route) route once daily at 6am. 100 strip 3    hydroCHLOROthiazide (HYDRODIURIL) 50 MG tablet Take 1 tablet (50 mg total) by mouth once daily. 30 tablet 0    ibuprofen (ADVIL,MOTRIN) 600 MG tablet Take 1 tablet (600 mg total) by mouth every 8 (eight) hours as needed for Pain. 15 tablet 0    lancets Misc 1 Device by Misc.(Non-Drug; Combo Route) route once daily at 6am. 100 each 3    ondansetron (ZOFRAN) 4 MG tablet Take 1 tablet (4 mg total) by mouth every 6 (six) hours as needed for Nausea. 12 tablet 0    OZEMPIC 0.25 mg or 0.5 mg (2 mg/3 mL) pen injector Inject 0.5 mg Subcutaneous once weekly for 28 days      amLODIPine (NORVASC) 10 MG tablet Take 1 tablet (10 mg total) by mouth once daily. 30 tablet 0    aspirin (ECOTRIN) 81 MG EC tablet Take 1 tablet (81 mg total) by mouth once daily. 30 tablet 0    blood-glucose meter Misc 1 Device by Misc.(Non-Drug; Combo Route) route once daily at 6am. 1 each 0    metFORMIN (GLUCOPHAGE) 500 MG tablet Take 1 tablet (500 mg total) by mouth 2 (two) times daily with meals. 60 tablet 3    oxyCODONE-acetaminophen (PERCOCET) 5-325 mg per tablet  Take 1 tablet by mouth every 6 (six) hours as needed for Pain. 12 tablet 0    pantoprazole (PROTONIX) 40 MG tablet Take 1 tablet (40 mg total) by mouth once daily. 30 tablet 0     No current facility-administered medications for this visit.     Review of patient's allergies indicates:   Allergen Reactions    Erythromycin Other (See Comments)     hallucinations       ROS  Review of Systems:  Constitutional: no fever or chills  Eyes: no visual changes  ENT: no nasal congestion or sore throat  Respiratory: no cough or shortness of breath  Cardiovascular: no chest pain  Gastrointestinal: no nausea or vomiting, tolerating diet  Musculoskeletal: pain and soreness      Objective:      Vital Signs (Most Recent):  There were no vitals filed for this visit.  There is no height or weight on file to calculate BMI.    Physical Exam  Cardiovascular:      Pulses:           Radial pulses are Normal on the right side and Normal on the left side.       Physical Exam:  Constitutional: The patient appears well-developed and well-nourished. No distress.   Skin: No lesions appreciated  Head: Normocephalic and atraumatic.   Nose: Nose normal.   Ears: No deformities seen  Eyes: Conjunctivae and EOM are normal.   Neck: No tracheal deviation present.   Cardiovascular: Normal rate and intact distal pulses.    Pulmonary/Chest: Effort normal. No respiratory distress.   Abdominal: There is no guarding.   Neurological: The patient is alert.   Psychiatric: The patient has a normal mood and affect.     Hand/Wrist Musculoskeletal Exam    Inspection    Right      Erythema: none      Ecchymosis: none      Edema: none      Deformity: none      Hand - prior incision: none    Left      Erythema: none      Ecchymosis: none      Edema: none      Deformity: none      Hand - prior incision: none    Palpation    Left      Index tenderness to palpation: proximal interphalangeal joint      Middle tenderness to palpation: proximal interphalangeal joint      Ring  tenderness to palpation: proximal interphalangeal joint    Palpation additional comments: + palpable clicking between left 3rd and 4th metacarpal joints with flexion of fingers into a fist motion    Range of Motion    Right Hand      Right hand range of motion is normal.      Left Hand      Left hand range of motion is normal.       Right Wrist      Right wrist range of motion is normal.      Left Wrist      Left wrist range of motion is normal.      Range of motion additional comments: Able to make a composite fist.    Neurovascular    Right       Radial pulse: normal      Capillary refill: brisk      Ulnar nerve sensory distribution: normal      Median nerve sensory distribution: normal      Superficial radial nerve sensory distribution: normal    Left       Radial pulse: normal      Capillary refill: brisk      Ulnar nerve sensory distribution: normal      Median nerve sensory distribution: normal      Superficial radial nerve sensory distribution: normal    Neurovascular additional comments:         Diagnostics Review:   X-Ray: Reviewed    I independently viewed the patient's imaging as well as the radiology report.    My personal interpretation of X-rays AP, lateral, and oblique of left hand demonstrate mild joint space narrowing of PIP joints with no fracture or dislocation.        Assessment:       54 y.o. yo male with   Encounter Diagnoses   Name Primary?    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 We have discussed the natural history of hand osteoarthritis including treatment options such as splinting, oral and topical anti-inflammatories, cortisone injections and surgery.  Patient would like to proceed with conservative management with use of oral  and topical anti-inflammatories. I provided him with an information packet on osteoarthritis and inflammation reduction.     Follow up as needed for any worsening symptoms.   Call with any questions/concerns in the interim       The patient's pathophysiology was explained in detail with reference to x-rays, models, other visual aids as appropriate. Treatment options were discussed in detail.  Questions were invited and answered to the patient's satisfaction. I reviewed Primary care , and other specialty's notes to better coordinate patient's care.    Please be aware that this note has been generated with the assistance of [employer] voice-to-text.  Please excuse any spelling or grammatical errors.

## 2024-10-22 ENCOUNTER — TELEPHONE (OUTPATIENT)
Dept: ENDOSCOPY | Facility: HOSPITAL | Age: 54
End: 2024-10-22
Payer: COMMERCIAL

## 2024-10-24 ENCOUNTER — TELEPHONE (OUTPATIENT)
Dept: ENDOSCOPY | Facility: HOSPITAL | Age: 54
End: 2024-10-24
Payer: COMMERCIAL

## 2024-10-24 VITALS — BODY MASS INDEX: 37.77 KG/M2 | WEIGHT: 235 LBS | HEIGHT: 66 IN

## 2024-10-24 DIAGNOSIS — Z12.11 COLON CANCER SCREENING: Primary | ICD-10-CM

## 2024-10-24 RX ORDER — SODIUM, POTASSIUM,MAG SULFATES 17.5-3.13G
1 SOLUTION, RECONSTITUTED, ORAL ORAL DAILY
Qty: 1 KIT | Refills: 0 | Status: SHIPPED | OUTPATIENT
Start: 2024-10-24 | End: 2024-10-26

## 2024-10-24 NOTE — TELEPHONE ENCOUNTER
Referral for procedure from Telephone call - direct access patient      Spoke to pt to schedule procedure(s) Colonoscopy       Physician to perform procedure(s) Dr. EDYTA Li  Date of Procedure (s) 11/21/24  Arrival Time 1:15 PM  Time of Procedure(s) 2:15 PM   Location of Procedure(s) [place] [address] Floor  Type of Rx Prep sent to patient: Suprep  Instructions provided to patient via [facility]    Patient was informed on the following information and verbalized understanding. Screening questionnaire reviewed with patient and complete. If procedure requires anesthesia, a responsible adult needs to be present to accompany the patient home, patient cannot drive after receiving anesthesia. Appointment details are tentative, especially check-in time. Patient will receive a prep-op call 7 days prior to confirm check-in time for procedure. If applicable the patient should contact their pharmacy to verify Rx for procedure prep is ready for pick-up. Patient was advised to call the scheduling department at [phone] if pharmacy states no Rx is available. Patient was advised to call the endoscopy scheduling department if any questions or concerns arise.      [facility] Scheduling Department

## 2024-11-19 ENCOUNTER — PATIENT MESSAGE (OUTPATIENT)
Dept: ENDOSCOPY | Facility: HOSPITAL | Age: 54
End: 2024-11-19
Payer: COMMERCIAL

## 2024-11-19 DIAGNOSIS — Z12.11 COLON CANCER SCREENING: Primary | ICD-10-CM

## 2024-11-19 RX ORDER — SODIUM, POTASSIUM,MAG SULFATES 17.5-3.13G
1 SOLUTION, RECONSTITUTED, ORAL ORAL DAILY
Qty: 1 KIT | Refills: 0 | Status: SHIPPED | OUTPATIENT
Start: 2024-11-19 | End: 2024-11-21

## 2024-11-21 ENCOUNTER — ANESTHESIA (OUTPATIENT)
Dept: ENDOSCOPY | Facility: HOSPITAL | Age: 54
End: 2024-11-21
Payer: COMMERCIAL

## 2024-11-21 ENCOUNTER — ANESTHESIA EVENT (OUTPATIENT)
Dept: ENDOSCOPY | Facility: HOSPITAL | Age: 54
End: 2024-11-21
Payer: COMMERCIAL

## 2024-11-21 ENCOUNTER — HOSPITAL ENCOUNTER (OUTPATIENT)
Facility: HOSPITAL | Age: 54
Discharge: HOME | End: 2024-11-21
Attending: INTERNAL MEDICINE | Admitting: INTERNAL MEDICINE
Payer: COMMERCIAL

## 2024-11-21 VITALS
HEART RATE: 63 BPM | BODY MASS INDEX: 37.77 KG/M2 | HEIGHT: 66 IN | SYSTOLIC BLOOD PRESSURE: 173 MMHG | OXYGEN SATURATION: 91 % | DIASTOLIC BLOOD PRESSURE: 108 MMHG | WEIGHT: 235 LBS | RESPIRATION RATE: 20 BRPM | TEMPERATURE: 98 F

## 2024-11-21 DIAGNOSIS — Z12.11 COLON CANCER SCREENING: ICD-10-CM

## 2024-11-21 LAB
GLUCOSE SERPL-MCNC: 100 MG/DL (ref 70–110)
POCT GLUCOSE: 100 MG/DL (ref 70–110)

## 2024-11-21 PROCEDURE — 88305 TISSUE EXAM BY PATHOLOGIST: CPT | Performed by: STUDENT IN AN ORGANIZED HEALTH CARE EDUCATION/TRAINING PROGRAM

## 2024-11-21 PROCEDURE — 37000008 HC ANESTHESIA 1ST 15 MINUTES: Performed by: INTERNAL MEDICINE

## 2024-11-21 PROCEDURE — 25000003 PHARM REV CODE 250: Performed by: INTERNAL MEDICINE

## 2024-11-21 PROCEDURE — 37000009 HC ANESTHESIA EA ADD 15 MINS: Performed by: INTERNAL MEDICINE

## 2024-11-21 PROCEDURE — 63600175 PHARM REV CODE 636 W HCPCS: Performed by: NURSE ANESTHETIST, CERTIFIED REGISTERED

## 2024-11-21 PROCEDURE — 82962 GLUCOSE BLOOD TEST: CPT | Performed by: INTERNAL MEDICINE

## 2024-11-21 PROCEDURE — 25000003 PHARM REV CODE 250: Performed by: NURSE ANESTHETIST, CERTIFIED REGISTERED

## 2024-11-21 PROCEDURE — 27201089 HC SNARE, DISP (ANY): Performed by: INTERNAL MEDICINE

## 2024-11-21 PROCEDURE — 45385 COLONOSCOPY W/LESION REMOVAL: CPT | Mod: 33 | Performed by: INTERNAL MEDICINE

## 2024-11-21 RX ORDER — PROPOFOL 10 MG/ML
VIAL (ML) INTRAVENOUS CONTINUOUS PRN
Status: DISCONTINUED | OUTPATIENT
Start: 2024-11-21 | End: 2024-11-21

## 2024-11-21 RX ORDER — TAMSULOSIN HYDROCHLORIDE 0.4 MG/1
0.4 CAPSULE ORAL DAILY
COMMUNITY

## 2024-11-21 RX ORDER — OLMESARTAN MEDOXOMIL 20 MG/1
20 TABLET ORAL DAILY
COMMUNITY

## 2024-11-21 RX ORDER — DEXMEDETOMIDINE HYDROCHLORIDE 100 UG/ML
INJECTION, SOLUTION INTRAVENOUS
Status: DISCONTINUED | OUTPATIENT
Start: 2024-11-21 | End: 2024-11-21

## 2024-11-21 RX ORDER — LIDOCAINE HYDROCHLORIDE 20 MG/ML
INJECTION INTRAVENOUS
Status: DISCONTINUED | OUTPATIENT
Start: 2024-11-21 | End: 2024-11-21

## 2024-11-21 RX ORDER — DEXTROMETHORPHAN/PSEUDOEPHED 2.5-7.5/.8
DROPS ORAL
Status: COMPLETED | OUTPATIENT
Start: 2024-11-21 | End: 2024-11-21

## 2024-11-21 RX ORDER — PROPOFOL 10 MG/ML
VIAL (ML) INTRAVENOUS
Status: DISCONTINUED | OUTPATIENT
Start: 2024-11-21 | End: 2024-11-21

## 2024-11-21 RX ORDER — SODIUM CHLORIDE 0.9 % (FLUSH) 0.9 %
10 SYRINGE (ML) INJECTION
Status: DISCONTINUED | OUTPATIENT
Start: 2024-11-21 | End: 2024-11-21 | Stop reason: HOSPADM

## 2024-11-21 RX ORDER — SEMAGLUTIDE 0.68 MG/ML
0.5 INJECTION, SOLUTION SUBCUTANEOUS
COMMUNITY

## 2024-11-21 RX ADMIN — PROPOFOL 20 MG: 10 INJECTION, EMULSION INTRAVENOUS at 12:11

## 2024-11-21 RX ADMIN — LIDOCAINE HYDROCHLORIDE 25 MG: 20 INJECTION, SOLUTION INTRAVENOUS at 01:11

## 2024-11-21 RX ADMIN — PROPOFOL 80 MG: 10 INJECTION, EMULSION INTRAVENOUS at 12:11

## 2024-11-21 RX ADMIN — LIDOCAINE HYDROCHLORIDE 100 MG: 20 INJECTION, SOLUTION INTRAVENOUS at 12:11

## 2024-11-21 RX ADMIN — PROPOFOL 150 MCG/KG/MIN: 10 INJECTION, EMULSION INTRAVENOUS at 12:11

## 2024-11-21 RX ADMIN — DEXMEDETOMIDINE HYDROCHLORIDE 10 MCG: 100 INJECTION, SOLUTION, CONCENTRATE INTRAVENOUS at 01:11

## 2024-11-21 NOTE — PROVATION PATIENT INSTRUCTIONS
Discharge Summary/Instructions after an Endoscopic Procedure  Patient Name: Carmelo Castle  Patient MRN: [id number]  Patient YOB: 1970 Thursday, November 21, 2024  Sean Li MD  Dear patient,  As a result of recent federal legislation (The Federal Cures Act), you may   receive lab or pathology results from your procedure in your [facility]   account before your physician is able to contact you. Your physician or   their representative will relay the results to you with their   recommendations at their soonest availability.  Thank you,  Your health is very important to us during the Covid Crisis. Following your   procedure today, you will receive a daily text for 2 weeks asking about   signs or symptoms of Covid 19.  Please respond to this text when you   receive it so we can follow up and keep you as safe as possible.   RESTRICTIONS:  During your procedure today, you received medications for sedation.  These   medications may affect your judgment, balance and coordination.  Therefore,   for 24 hours, you have the following restrictions:   - DO NOT drive a car, operate machinery, make legal/financial decisions,   sign important papers or drink alcohol.    ACTIVITY:  Today: no heavy lifting, straining or running due to procedural   sedation/anesthesia.  The following day: return to full activity including work.  DIET:  Eat and drink normally unless instructed otherwise.     TREATMENT FOR COMMON SIDE EFFECTS:  - Mild abdominal pain, nausea, belching, bloating or excessive gas:  rest,   eat lightly and use a heating pad.  - Sore Throat: treat with throat lozenges and/or gargle with warm salt   water.  - Because air was used during the procedure, expelling large amounts of air   from your rectum or belching is normal.  - If a bowel prep was taken, you may not have a bowel movement for 1-3 days.    This is normal.  SYMPTOMS TO WATCH FOR AND REPORT TO YOUR PHYSICIAN:  1. Abdominal pain or bloating, other than  gas cramps.  2. Chest pain.  3. Back pain.  4. Signs of infection such as: chills or fever occurring within 24 hours   after the procedure.  5. Rectal bleeding, which would show as bright red, maroon, or black stools.   (A tablespoon of blood from the rectum is not serious, especially if   hemorrhoids are present.)  6. Vomiting.  7. Weakness or dizziness.  GO DIRECTLY TO THE NEAREST EMERGENCY ROOM IF YOU HAVE ANY OF THE FOLLOWING:      Difficulty breathing              Chills and/or fever over 101 F   Persistent vomiting and/or vomiting blood   Severe abdominal pain   Severe chest pain   Black, tarry stools   Bleeding- more than one tablespoon   Any other symptom or condition that you feel may need urgent attention  Your doctor recommends these additional instructions:  If any biopsies were taken, your doctors clinic will contact you in 1 to 2   weeks with any results.  - Await pathology results.   - Repeat colonoscopy in 5 years for surveillance.   - Discharge to home  - Resume previous diet and medications  - Condition stable   - The signs and symptoms of potential delayed complications were discussed   with the patient. If signs or symptoms of these complications develop, call   the [facility] at [phone].   - Return to normal activities tomorrow.  Written discharge instructions were   provided to the patient.  For questions, problems or results please call your physician - Sean Li MD.  EMERGENCY PHONE NUMBER: [phone],  LAB RESULTS: [phone]  IF A COMPLICATION OR EMERGENCY SITUATION ARISES AND YOU ARE UNABLE TO REACH   YOUR PHYSICIAN - GO DIRECTLY TO THE EMERGENCY ROOM.  MD Sean Mahan MD  11/21/2024 1:46:36 PM  This report has been verified and signed electronically.  Dear patient,  As a result of recent federal legislation (The Federal Cures Act), you may   receive lab or pathology results from your procedure in your [facility]   account before your  physician is able to contact you. Your physician or   their representative will relay the results to you with their   recommendations at their soonest availability.  Thank you,  [employer]OVATION

## 2024-11-21 NOTE — TRANSFER OF CARE
"Anesthesia Transfer of Care Note    Patient: Carmelo Castle Jr.    Procedure(s) Performed: Procedure(s) (LRB):  COLONOSCOPY, SCREENING, LOW RISK PATIENT (N/A)    Patient location: GI    Anesthesia Type: general    Transport from OR: Transported from OR on room air with adequate spontaneous ventilation    Post pain: adequate analgesia    Post assessment: no apparent anesthetic complications and tolerated procedure well    Post vital signs: stable    Level of consciousness: sedated    Nausea/Vomiting: no nausea/vomiting    Complications: none    Transfer of care protocol was followed    Last vitals: Visit Vitals  BP (!) 199/110 (BP Location: Left arm, Patient Position: Lying)   Pulse 71   Temp 36.9 °C (98.4 °F) (Temporal)   Resp 19   Ht 5' 6" (1.676 m)   Wt 106.6 kg (235 lb)   SpO2 96%   BMI 37.93 kg/m²     "

## 2024-11-21 NOTE — ANESTHESIA PREPROCEDURE EVALUATION
[facility]  Anesthesia Pre-Operative Evaluation         Patient Name: Carmelo Castle Jr.  YOB: 1970  MRN: [id number]    SUBJECTIVE:     Pre-operative evaluation for Procedure(s) (LRB):  COLONOSCOPY, SCREENING, LOW RISK PATIENT (N/A)     11/21/2024    Carmelo Castle Jr. is a 54 y.o. male w/ a significant PMHx of HTN, smoker, obesity.  Patient now presents for the above procedure(s).    TTE:   The left ventricle is normal in size with concentric remodeling and normal systolic function.  The estimated ejection fraction is 55%.  Normal left ventricular diastolic function.  The estimated PA systolic pressure is 8 mmHg.  Normal right ventricular size with normal right ventricular systolic function.  Normal central venous pressure (3 mmHg).       Patient Active Problem List   Diagnosis    Other chest pain    Tobacco dependence    Morbid obesity    Hypertensive emergency    Hyperglycemia       Review of patient's allergies indicates:   Allergen Reactions    Erythromycin Other (See Comments)     hallucinations       Current Inpatient Medications:      No current facility-administered medications on file prior to encounter.     Current Outpatient Medications on File Prior to Encounter   Medication Sig Dispense Refill    aspirin (ECOTRIN) 81 MG EC tablet Take 1 tablet (81 mg total) by mouth once daily. 30 tablet 0    blood sugar diagnostic Strp 1 strip by Misc.(Non-Drug; Combo Route) route once daily at 6am. 100 strip 3    blood-glucose meter Misc 1 Device by Misc.(Non-Drug; Combo Route) route once daily at 6am. 1 each 0    hydroCHLOROthiazide (HYDRODIURIL) 50 MG tablet Take 1 tablet (50 mg total) by mouth once daily. 30 tablet 0    ibuprofen (ADVIL,MOTRIN) 600 MG tablet Take 1 tablet (600 mg total) by mouth every 8 (eight) hours as needed for Pain. 15 tablet 0    lancets Misc 1 Device by Misc.(Non-Drug; Combo Route) route once daily at 6am. 100 each 3    ondansetron (ZOFRAN) 4 MG tablet Take  1 tablet (4 mg total) by mouth every 6 (six) hours as needed for Nausea. 12 tablet 0    pantoprazole (PROTONIX) 40 MG tablet Take 1 tablet (40 mg total) by mouth once daily. 30 tablet 0       No past surgical history on file.    OBJECTIVE:     Vital Signs Range (Last 24H):         Significant Labs:  Lab Results   Component Value Date    WBC 7.10 11/27/2023    HGB 15.3 11/27/2023    HCT 45.3 11/27/2023     (L) 11/27/2023    CHOL 176 06/07/2022    TRIG 140 06/07/2022    HDL 76 (H) 06/07/2022    ALT 21 11/27/2023    AST 22 11/27/2023     11/27/2023    K 3.6 11/27/2023     11/27/2023    CREATININE 1.3 11/27/2023    BUN 11 11/27/2023    CO2 24 11/27/2023    TSH 1.805 06/15/2022    INR 1.0 06/30/2011    GLUF 111 (H) 04/16/2008    HGBA1C 7.4 (H) 06/15/2022       Diagnostic Studies: No relevant studies.    EKG:   Results for orders placed or performed during the hospital encounter of 11/27/23   EKG 12-lead    Collection Time: 11/28/23 12:17 AM    Narrative    Test Reason :     Vent. Rate : 063 BPM     Atrial Rate : 063 BPM     P-R Int : 156 ms          QRS Dur : 096 ms      QT Int : 438 ms       P-R-T Axes : 062 008 229 degrees     QTc Int : 448 ms    Normal sinus rhythm  ST and T wave abnormality, consider lateral ischemia  Abnormal ECG  When compared with ECG of 27-NOV-2023 20:32,  No significant change was found  Confirmed by Rito Tellez MD ([id number]) on 11/28/2023 5:24:09 PM    Referred By: [email or web address]   SELF           Confirmed By:Rito Tellez MD       ASSESSMENT/PLAN:           Pre-op Assessment    I have reviewed the Patient Summary Reports.     I have reviewed the Nursing Notes. I have reviewed the NPO Status.   I have reviewed the Medications.     Review of Systems  Anesthesia Hx:  No previous Anesthesia             Denies Family Hx of Anesthesia complications.    Denies Personal Hx of Anesthesia complications.                    Social:  Smoker       Hematology/Oncology:  Hematology Normal    Oncology Normal                                   EENT/Dental:  EENT/Dental Normal           Cardiovascular:     Hypertension                                          Pulmonary:  Pulmonary Normal                       Renal/:  Renal/ Normal                 Hepatic/GI:  Hepatic/GI Normal                    Neurological:  Neurology Normal                                      Endocrine:  Diabetes         Obesity / BMI > 30  Psych:  Psychiatric Normal                    Physical Exam  General: Cooperative, Alert and Oriented    Airway:  Mallampati: III   Mouth Opening: Normal  TM Distance: Normal  Tongue: Normal  Neck ROM: Normal ROM    Dental:  Intact        Anesthesia Plan  Type of Anesthesia, risks & benefits discussed:    Anesthesia Type: Gen Natural Airway  Intra-op Monitoring Plan: Standard ASA Monitors  Post Op Pain Control Plan: multimodal analgesia  Induction:  IV  Informed Consent: Informed consent signed with the Patient and all parties understand the risks and agree with anesthesia plan.  All questions answered.   ASA Score: 2  Day of Surgery Review of History & Physical: H&P Update referred to the surgeon/provider.    Ready For Surgery From Anesthesia Perspective.     .

## 2024-11-21 NOTE — H&P
[facility]    CC: screening    HPI 54 y.o. male. Here for colonoscopy direct scheduled. Not associated with GI bleeding, nausea, vomiting. Denies family history of colon cancer or gastric cancer in FDR. Not on anticoagulation.       Past Medical History  Past Medical History:   Diagnosis Date    Diabetes mellitus     Hypertension          Physical Examination  There were no vitals taken for this visit.  General appearance: alert, cooperative, no distress  Lungs: clear to auscultation bilaterally, no dullness to percussion bilaterally  Heart: regular rate and rhythm without rub; no displacement of the PMI   Abdomen: soft, non-tender; bowel sounds normoactive; no organomegaly      Assessment:  54 y.o. male. Here for colonoscopy direct scheduled. Not associated with GI bleeding, nausea, vomiting. Denies family history of colon cancer or gastric cancer in FDR. Not on anticoagulation.     Plan:  Colonoscopy today    Sean Li MD   [address] [place] [address], [address]  [place] [address] [phone]

## 2024-11-21 NOTE — ANESTHESIA POSTPROCEDURE EVALUATION
Anesthesia Post Evaluation    Patient: Carmelo Castle JrQuinton    Procedure(s) Performed: Procedure(s) (LRB):  COLONOSCOPY, SCREENING, LOW RISK PATIENT (N/A)    Final Anesthesia Type: general      Patient location during evaluation: GI PACU  Patient participation: Yes- Able to Participate  Level of consciousness: awake and alert, oriented and awake  Post-procedure vital signs: reviewed and stable  Pain management: adequate  Airway patency: patent  LENIN mitigation strategies: Multimodal analgesia  PONV status at discharge: No PONV  Anesthetic complications: no      Cardiovascular status: blood pressure returned to baseline and hemodynamically stable  Respiratory status: unassisted and spontaneous ventilation  Hydration status: euvolemic  Follow-up not needed.              Vitals Value Taken Time   /108 11/21/24 1410   Temp 36.6 °C (97.8 °F) 11/21/24 1340   Pulse 63 11/21/24 1410   Resp 20 11/21/24 1410   SpO2 91 % 11/21/24 1410         Event Time   Out of Recovery 14:13:37         Pain/Tone Score: No data recorded

## 2024-11-25 LAB
FINAL PATHOLOGIC DIAGNOSIS: NORMAL
GROSS: NORMAL
Lab: NORMAL

## 2024-12-28 PROBLEM — Z12.11 COLON CANCER SCREENING: Status: ACTIVE | Noted: 2024-12-28

## 2024-12-28 PROBLEM — K63.5 POLYP OF COLON: Status: ACTIVE | Noted: 2024-12-28

## 2025-05-07 DIAGNOSIS — R06.83 SNORING: Primary | ICD-10-CM
